# Patient Record
Sex: MALE | Race: OTHER | Employment: FULL TIME | URBAN - METROPOLITAN AREA
[De-identification: names, ages, dates, MRNs, and addresses within clinical notes are randomized per-mention and may not be internally consistent; named-entity substitution may affect disease eponyms.]

---

## 2017-02-06 ENCOUNTER — OFFICE VISIT (OUTPATIENT)
Dept: FAMILY MEDICINE CLINIC | Age: 47
End: 2017-02-06

## 2017-02-06 VITALS
OXYGEN SATURATION: 99 % | BODY MASS INDEX: 27.11 KG/M2 | DIASTOLIC BLOOD PRESSURE: 83 MMHG | RESPIRATION RATE: 20 BRPM | HEART RATE: 68 BPM | SYSTOLIC BLOOD PRESSURE: 129 MMHG | WEIGHT: 183 LBS | HEIGHT: 69 IN | TEMPERATURE: 97.9 F

## 2017-02-06 DIAGNOSIS — G47.00 INSOMNIA, UNSPECIFIED TYPE: ICD-10-CM

## 2017-02-06 DIAGNOSIS — R45.89 DEPRESSED MOOD: ICD-10-CM

## 2017-02-06 DIAGNOSIS — Z00.00 ADULT GENERAL MEDICAL EXAM: Primary | ICD-10-CM

## 2017-02-06 DIAGNOSIS — Z23 ENCOUNTER FOR IMMUNIZATION: ICD-10-CM

## 2017-02-06 DIAGNOSIS — E78.2 MIXED HYPERLIPIDEMIA: ICD-10-CM

## 2017-02-06 DIAGNOSIS — R53.83 FATIGUE, UNSPECIFIED TYPE: ICD-10-CM

## 2017-02-06 DIAGNOSIS — R35.1 NOCTURIA: ICD-10-CM

## 2017-02-06 DIAGNOSIS — R07.9 CHEST PAIN, UNSPECIFIED TYPE: ICD-10-CM

## 2017-02-06 DIAGNOSIS — N40.1 BENIGN NON-NODULAR PROSTATIC HYPERPLASIA WITH LOWER URINARY TRACT SYMPTOMS: ICD-10-CM

## 2017-02-06 LAB
BILIRUB UR QL STRIP: NEGATIVE
GLUCOSE UR-MCNC: NEGATIVE MG/DL
KETONES P FAST UR STRIP-MCNC: NEGATIVE MG/DL
PH UR STRIP: 5.5 [PH] (ref 4.6–8)
PROT UR QL STRIP: NEGATIVE MG/DL
SP GR UR STRIP: 1.02 (ref 1–1.03)
UA UROBILINOGEN AMB POC: NORMAL (ref 0.2–1)
URINALYSIS CLARITY POC: CLEAR
URINALYSIS COLOR POC: YELLOW
URINE BLOOD POC: NEGATIVE
URINE LEUKOCYTES POC: NEGATIVE
URINE NITRITES POC: NEGATIVE

## 2017-02-06 RX ORDER — ATORVASTATIN CALCIUM 80 MG/1
80 TABLET, FILM COATED ORAL DAILY
Qty: 30 TAB | Refills: 3 | Status: SHIPPED | OUTPATIENT
Start: 2017-02-06 | End: 2019-08-29

## 2017-02-06 RX ORDER — TAMSULOSIN HYDROCHLORIDE 0.4 MG/1
0.4 CAPSULE ORAL DAILY
Qty: 30 CAP | Refills: 1 | Status: SHIPPED | OUTPATIENT
Start: 2017-02-06 | End: 2017-02-20 | Stop reason: SDUPTHER

## 2017-02-06 RX ORDER — SERTRALINE HYDROCHLORIDE 25 MG/1
25 TABLET, FILM COATED ORAL DAILY
Qty: 30 TAB | Refills: 1 | Status: SHIPPED | OUTPATIENT
Start: 2017-02-06 | End: 2018-02-13 | Stop reason: SDUPTHER

## 2017-02-06 NOTE — PATIENT INSTRUCTIONS
1. Restart your cholesterol medication, Lipitor    2. A stress test has been ordered for you. Please schedule and have this done. 3. We will start Zoloft for your depression. Follow up in 2 weeks to see how things are going    4. If you are unable to get into a counselor with your current insurance, you can follow up in our [address] 5. We will start a medication called Flomax (information included below) to see if this helps with your urinary issues. 6.  If you feel that your stress is overwhelming and you need to speak with someone immediately, please call the crisis hotline. [address]  [phone]  [address] [address] [phone] [address] [facility]-  [phone]  [facility]-  [phone]         Benign Prostatic Hyperplasia: Care Instructions  Your Care Instructions    Benign prostatic hyperplasia, or BPH, is an enlarged prostate gland. The prostate is a small gland that makes some of the fluid in semen. Prostate enlargement happens to almost all men as they age. It is usually not serious. BPH does not cause prostate cancer. As the prostate gets bigger, it may partly block the flow of urine. You may have a hard time getting a urine stream started or completely stopped. BPH can cause dribbling. You may have a weak urine stream, or you may have to urinate more often than you used to, especially at night. Most men find these problems easy to manage. You do not need treatment unless your symptoms bother you a lot or you have other problems, such as bladder infections or stones. In these cases, medicines may help. Surgery is not needed unless the urine flow is blocked or the symptoms do not get better with medicine. Follow-up care is a key part of your treatment and safety. Be sure to make and go to all appointments, and call your doctor if you are having problems.  It's also a good idea to know your test results and keep a list of the medicines you take. How can you care for yourself at home? · Take plenty of time to urinate. Try to relax. · Try \"double voiding. \" Urinate as much you can, relax for a few moments, and then try to urinate again. · Sit on the toilet to urinate. · Read or think of other things while you are waiting. · Turn on a faucet, or try to picture running water. Some men find that this helps get their urine flowing. · If dribbling is a problem, wash your penis daily to avoid skin irritation and infection. · Avoid caffeine and alcohol. These drinks will increase how often you need to urinate. Spread your fluid intake throughout the day. If the urge to urinate often wakes you at night, limit your fluid intake in the evening. Urinate right before you go to bed. · Many over-the-counter cold and allergy medicines can make the symptoms of BPH worse. Avoid antihistamines, decongestants, and allergy pills, if you can. Read the warnings on the package. · If you take any prescription medicines, especially tranquilizers or antidepressants, ask your doctor or pharmacist whether they can cause urination problems. There may be other medicines you can use that do not cause urinary problems. · Be safe with medicines. Take your medicines exactly as prescribed. Call your doctor if you think you are having a problem with your medicine. When should you call for help? Call your doctor now or seek immediate medical care if:  · You cannot urinate at all. · You have symptoms of a urinary infection. For example:  ¨ You have blood or pus in your urine. ¨ You have pain in your back just below your rib cage. This is called flank pain. ¨ You have a fever, chills, or body aches. ¨ It hurts to urinate. ¨ You have groin or belly pain. Watch closely for changes in your health, and be sure to contact your doctor if:  · It hurts when you ejaculate.   · Your urinary problems get a lot worse or bother you a lot. Where can you learn more? Go to http://mary-clara.info/. Enter O641 in the search box to learn more about \"Benign Prostatic Hyperplasia: Care Instructions. \"  Current as of: May 24, 2016  Content Version: 11.1  © 9641-8354 [employer]. Care instructions adapted under license by [employer] (which disclaims liability or warranty for this information). If you have questions about a medical condition or this instruction, always ask your healthcare professional. [address] any warranty or liability for your use of this information. Tamsulosin (By mouth)   Tamsulosin Hydrochloride (sbj-WEO-jut-sin brandy-droe-KLOR-toya)  Treats benign prostatic hyperplasia (enlarged prostate). Brand Name(s):Flomax   There may be other brand names for this medicine. When This Medicine Should Not Be Used: This medicine is not right for everyone. Do not use it if you had an allergic reaction to tamsulosin. How to Use This Medicine:   Capsule  · Take your medicine as directed. Your dose may need to be changed several times to find what works best for you. · Take this medicine 30 minutes after the same meal each day. Swallow the capsule whole. Do not crush, chew, or open it. · Read and follow the patient instructions that come with this medicine. Talk to your doctor or pharmacist if you have any questions. · Missed dose: Take a dose as soon as you remember. If it is almost time for your next dose, wait until then and take a regular dose. Do not take extra medicine to make up for a missed dose. If you forget to take this medicine for several days in a row, talk to your doctor before you start taking it again. · Store the medicine in a closed container at room temperature, away from heat, moisture, and direct light.   Drugs and Foods to Avoid:   Ask your doctor or pharmacist before using any other medicine, including over-the-counter medicines, vitamins, and herbal products. · Some medicines can affect how tamsulosin works. Tell your doctor if you are using another alpha blocker medicine, cimetidine, erythromycin, ketoconazole, paroxetine, terbinafine, warfarin, or medicine for erectile dysfunction. Warnings While Using This Medicine:   · Tell your doctor if you have kidney disease, liver disease, low blood pressure, prostate cancer, or an allergy to sulfa drugs. · Tell your doctor if you plan to have cataract or glaucoma surgery. This medicine may cause eye problems during surgery. · This medicine may make you dizzy or lightheaded. Do not drive or do anything else that could be dangerous until you know how this medicine affects you. Stand or sit up slowly if you feel dizzy. · Your doctor will check your progress and the effects of this medicine at regular visits. Keep all appointments. · Keep all medicine out of the reach of children. Never share your medicine with anyone. Possible Side Effects While Using This Medicine:   Call your doctor right away if you notice any of these side effects:  · Allergic reaction: Itching or hives, swelling in your face or hands, swelling or tingling in your mouth or throat, chest tightness, trouble breathing  · Blistering, peeling, red skin rash  · Lightheadedness, dizziness, fainting  · Painful, prolonged erection of your penis  If you notice these less serious side effects, talk with your doctor:   · Headache  · Problems with ejaculation  · Runny or stuffy nose  If you notice other side effects that you think are caused by this medicine, tell your doctor. Call your doctor for medical advice about side effects. You may report side effects to FDA at 8-178-FDA-7215  © 2016 [address] is for End User's use only and may not be sold, redistributed or otherwise used for commercial purposes. The above information is an  only.  It is not intended as medical advice for individual conditions or treatments. Talk to your doctor, nurse or pharmacist before following any medical regimen to see if it is safe and effective for you.

## 2017-02-06 NOTE — MR AVS SNAPSHOT
Visit Information Date & Time Provider Department Dept. Phone Encounter #  
 2/6/2017  8:00 AM Arsen Duggan [facility] [phone] [id number] Follow-up Instructions Return in about 2 weeks (around 2/20/2017) for Depression. Upcoming Health Maintenance Date Due DTaP/Tdap/Td series (1 - Tdap) 8/20/1991 INFLUENZA AGE 9 TO ADULT 8/1/2016 Allergies as of 2/6/2017  Review Complete On: 2/6/2017 By: Chase Negro LPN No Known Allergies Current Immunizations  Reviewed on 11/2/2015 Name Date Influenza Nasal Vaccine 10/27/2015 Not reviewed this visit You Were Diagnosed With   
  
 Codes Comments Adult general medical exam    -  Primary ICD-10-CM: Z00.00 ICD-9-CM: V70.9 Mixed hyperlipidemia     ICD-10-CM: E78.2 ICD-9-CM: 272.2 Depressed mood     ICD-10-CM: F32.9 ICD-9-CM: 013 Insomnia, unspecified type     ICD-10-CM: G47.00 ICD-9-CM: 780.52 Fatigue, unspecified type     ICD-10-CM: R53.83 ICD-9-CM: 780.79 Nocturia     ICD-10-CM: R35.1 ICD-9-CM: 788.43 Chest pain, unspecified type     ICD-10-CM: R07.9 ICD-9-CM: 786.50 Vitals BP Pulse Temp Resp Height(growth percentile) Weight(growth percentile) 145/84 (BP 1 Location: Left arm, BP Patient Position: Sitting) 66 97.9 °F (36.6 °C) (Oral) 20 5' 9\" (1.753 m) 183 lb (83 kg) SpO2 BMI Smoking Status 99% 27.02 kg/m2 Former Smoker Vitals History BMI and BSA Data Body Mass Index Body Surface Area  
 27.02 kg/m 2 2.01 m 2 Preferred Pharmacy Pharmacy Name Phone [facility] [address] [place] [facility] [phone] Your Updated Medication List  
  
   
This list is accurate as of: 2/6/17  8:40 AM.  Always use your most recent med list.  
  
  
  
  
 atorvastatin 80 mg tablet Commonly known as:  LIPITOR Take 1 Tab by mouth daily. Indications: mixed hyperlipidemia  
  
 naproxen 500 mg tablet Commonly known as:  NAPROSYN Take 1 Tab by mouth two (2) times daily (with meals). omeprazole 20 mg capsule Commonly known as:  PRILOSEC Take 1 Cap by mouth daily. Indications: GASTROESOPHAGEAL REFLUX  
  
 sertraline 25 mg tablet Commonly known as:  ZOLOFT Take 1 Tab by mouth daily. tamsulosin 0.4 mg capsule Commonly known as:  FLOMAX Take 1 Cap by mouth daily. Prescriptions Sent to Pharmacy Refills  
 atorvastatin (LIPITOR) 80 mg tablet 3 Sig: Take 1 Tab by mouth daily. Indications: mixed hyperlipidemia Class: Normal  
 Pharmacy: [address] Ph #: [phone] Route: Oral  
 sertraline (ZOLOFT) 25 mg tablet 1 Sig: Take 1 Tab by mouth daily. Class: Normal  
 Pharmacy: [address] Ph #: [phone] Route: Oral  
 tamsulosin (FLOMAX) 0.4 mg capsule 1 Sig: Take 1 Cap by mouth daily. Class: Normal  
 Pharmacy: [address] Ph #: [phone] Route: Oral  
  
We Performed the Following AMB POC EKG ROUTINE W/ 12 LEADS, INTER & REP [48774 CPT(R)] AMB POC URINALYSIS DIP STICK AUTO W/O MICRO [52232 CPT(R)] CBC W/O DIFF [27401 CPT(R)] LIPID PANEL [88075 CPT(R)] METABOLIC PANEL, COMPREHENSIVE [06353 CPT(R)] TSH 3RD GENERATION [25205 CPT(R)] Follow-up Instructions Return in about 2 weeks (around 2/20/2017) for Depression. To-Do List   
 02/06/2017 ECG:  STRESS TEST CARDIAC Patient Instructions 1. Restart your cholesterol medication, Lipitor 2. A stress test has been ordered for you. Please schedule and have this done. 3. We will start Zoloft for your depression. Follow up in 2 weeks to see how things are going 4. If you are unable to get into a counselor with your current insurance, you can follow up in our [address] 5. We will start a medication called Flomax (information included below) to see if this helps with your urinary issues. 6.  If you feel that your stress is overwhelming and you need to speak with someone immediately, please call the crisis hotline. [employer]  [phone] [facility] [address] [phone] [place] [address]  [phone] [employer]- [phone] [employer]-  [phone] 7 [facility]  [phone] [address] [facility]  [phone] Benign Prostatic Hyperplasia: Care Instructions Your Care Instructions Benign prostatic hyperplasia, or BPH, is an enlarged prostate gland. The prostate is a small gland that makes some of the fluid in semen. Prostate enlargement happens to almost all men as they age. It is usually not serious. BPH does not cause prostate cancer. As the prostate gets bigger, it may partly block the flow of urine. You may have a hard time getting a urine stream started or completely stopped. BPH can cause dribbling. You may have a weak urine stream, or you may have to urinate more often than you used to, especially at night. Most men find these problems easy to manage. You do not need treatment unless your symptoms bother you a lot or you have other problems, such as bladder infections or stones. In these cases, medicines may help. Surgery is not needed unless the urine flow is blocked or the symptoms do not get better with medicine. Follow-up care is a key part of your treatment and safety. Be sure to make and go to all appointments, and call your doctor if you are having problems. It's also a good idea to know your test results and keep a list of the medicines you take. How can you care for yourself at home? · Take plenty of time to urinate. Try to relax. · Try \"double voiding. \" Urinate as much you can, relax for a few moments, and then try to urinate again. · Sit on the toilet to urinate. · Read or think of other things while you are waiting. · Turn on a faucet, or try to picture running water. Some men find that this helps get their urine flowing. · If dribbling is a problem, wash your penis daily to avoid skin irritation and infection. · Avoid caffeine and alcohol. These drinks will increase how often you need to urinate. Spread your fluid intake throughout the day. If the urge to urinate often wakes you at night, limit your fluid intake in the evening. Urinate right before you go to bed. · Many over-the-counter cold and allergy medicines can make the symptoms of BPH worse. Avoid antihistamines, decongestants, and allergy pills, if you can. Read the warnings on the package. · If you take any prescription medicines, especially tranquilizers or antidepressants, ask your doctor or pharmacist whether they can cause urination problems. There may be other medicines you can use that do not cause urinary problems. · Be safe with medicines. Take your medicines exactly as prescribed. Call your doctor if you think you are having a problem with your medicine. When should you call for help? Call your doctor now or seek immediate medical care if: 
· You cannot urinate at all. · You have symptoms of a urinary infection. For example: ¨ You have blood or pus in your urine. ¨ You have pain in your back just below your rib cage. This is called flank pain. ¨ You have a fever, chills, or body aches. ¨ It hurts to urinate. ¨ You have groin or belly pain. Watch closely for changes in your health, and be sure to contact your doctor if: 
· It hurts when you ejaculate. · Your urinary problems get a lot worse or bother you a lot. Where can you learn more? Go to http://delio.info/. Enter W174 in the search box to learn more about \"Benign Prostatic Hyperplasia: Care Instructions. \" Current as of: May 24, 2016 Content Version: 11.1 © 7099-7062 [employer]. Care instructions adapted under license by [employer] (which disclaims liability or warranty for this information). If you have questions about a medical condition or this instruction, always ask your healthcare professional. [address] any warranty or liability for your use of this information. Tamsulosin (By mouth) Tamsulosin Hydrochloride (gtt-YZS-mpl-sin brandy-droe-KLOR-toya) Treats benign prostatic hyperplasia (enlarged prostate). Brand Name(s):Flomax There may be other brand names for this medicine. When This Medicine Should Not Be Used: This medicine is not right for everyone. Do not use it if you had an allergic reaction to tamsulosin. How to Use This Medicine:  
Capsule · Take your medicine as directed. Your dose may need to be changed several times to find what works best for you. · Take this medicine 30 minutes after the same meal each day. Swallow the capsule whole. Do not crush, chew, or open it. · Read and follow the patient instructions that come with this medicine. Talk to your doctor or pharmacist if you have any questions. · Missed dose: Take a dose as soon as you remember. If it is almost time for your next dose, wait until then and take a regular dose. Do not take extra medicine to make up for a missed dose. If you forget to take this medicine for several days in a row, talk to your doctor before you start taking it again. · Store the medicine in a closed container at room temperature, away from heat, moisture, and direct light. Drugs and Foods to Avoid: Ask your doctor or pharmacist before using any other medicine, including over-the-counter medicines, vitamins, and herbal products. · Some medicines can affect how tamsulosin works.  Tell your doctor if you are using another alpha blocker medicine, cimetidine, erythromycin, ketoconazole, paroxetine, terbinafine, warfarin, or medicine for erectile dysfunction. Warnings While Using This Medicine: · Tell your doctor if you have kidney disease, liver disease, low blood pressure, prostate cancer, or an allergy to sulfa drugs. · Tell your doctor if you plan to have cataract or glaucoma surgery. This medicine may cause eye problems during surgery. · This medicine may make you dizzy or lightheaded. Do not drive or do anything else that could be dangerous until you know how this medicine affects you. Stand or sit up slowly if you feel dizzy. · Your doctor will check your progress and the effects of this medicine at regular visits. Keep all appointments. · Keep all medicine out of the reach of children. Never share your medicine with anyone. Possible Side Effects While Using This Medicine:  
Call your doctor right away if you notice any of these side effects: · Allergic reaction: Itching or hives, swelling in your face or hands, swelling or tingling in your mouth or throat, chest tightness, trouble breathing · Blistering, peeling, red skin rash · Lightheadedness, dizziness, fainting · Painful, prolonged erection of your penis If you notice these less serious side effects, talk with your doctor:  
· Headache · Problems with ejaculation · Runny or stuffy nose If you notice other side effects that you think are caused by this medicine, tell your doctor. Call your doctor for medical advice about side effects. You may report side effects to FDA at 3-760-FDA-2174 © 2016 [address] is for End User's use only and may not be sold, redistributed or otherwise used for commercial purposes. The above information is an  only. It is not intended as medical advice for individual conditions or treatments.  Talk to your doctor, nurse or pharmacist before following any medical regimen to see if it is safe and effective for you. Introducing [facility]! Collette Mansfield introduces [employer] patient portal. Now you can access parts of your medical record, email your doctor's office, and request medication refills online. 1. In your internet browser, go to [email or web address] 2. Click on the First Time User? Click Here link in the Sign In box. You will see the New Member Sign Up page. 3. Enter your [employer] Access Code exactly as it appears below. You will not need to use this code after youve completed the sign-up process. If you do not sign up before the expiration date, you must request a new code. · [employer] Access Code: [email or web address] Expires: 5/7/2017  8:40 AM 
 
4. Enter the last four digits of your Social Security Number (xxxx) and Date of Birth (mm/dd/yyyy) as indicated and click Submit. You will be taken to the next sign-up page. 5. Create a [employer] ID. This will be your [employer] login ID and cannot be changed, so think of one that is secure and easy to remember. 6. Create a [employer] password. You can change your password at any time. 7. Enter your Password Reset Question and Answer. This can be used at a later time if you forget your password. 8. Enter your e-mail address. You will receive e-mail notification when new information is available in [address]. 9. Click Sign Up. You can now view and download portions of your medical record. 10. Click the Download Summary menu link to download a portable copy of your medical information. If you have questions, please visit the Frequently Asked Questions section of the [employer] website. Remember, [employer] is NOT to be used for urgent needs. For medical emergencies, dial 911. Now available from your iPhone and Android! Please provide this summary of care documentation to your next provider. Your primary care clinician is listed as Isha Ellison. If you have any questions after today's visit, please call [phone].

## 2017-02-06 NOTE — PROGRESS NOTES
Chief Complaint   Patient presents with    Well Male     patient fasting    Influenza Vaccine per order from MD  1. Have you been to the ER, urgent care clinic since your last visit? Hospitalized since your last visit? No    2. Have you seen or consulted any other health care providers outside of the [address] [place] [address] since your last visit? Include any pap smears or colon screening. No     Reviewed record in preparation for visit and have obtained necessary documentation.

## 2017-02-06 NOTE — PROGRESS NOTES
Subjective:   Ramirez Zhao is an 55 y.o. male who presents for complete physical exam.    Doing well. Not taking any medications listed. Complains of left sided back and side pain. The pain radiates down into the groin. The pain is worse when lying on that side. Nocturia 4-6 times per night. No blood in urine. No hx of kidney stones. Also endorses dry mouth and needing to drink a lot of water. Reports that at times he has difficulty starting his stream and feels that he is unable to empty his bladder completely. Feels that he is stressed. Waking frequently at night and feeling tired throughout the night. Has REJI but not using CPAP. Continues to have left sided chest pain. Not associated with activity. He notes associated nausea and reflux. Has tried Prilosec which helps with the reflex, but no change in the chest pain. Diet: A lot of fast foods. Drinks mostly soda. Exercise: No formal exercise regimen    Stress/ psych:  Under a lot of stress. AUA Symptom Score 2/6/2017   Over the past month how often have you had the sensation that your bladder was not completely empty after you finished urinating? 3   Over the past month, how often have had to urinate again less than 2 hours after you last finished urinating? 3   Over the past month, how often have you found you stopped and started again several times when you urinated? 3   Over the past month, how often have you found it difficult to postpone urination? 3   Over the past month, how often have you had a weak urinary stream? 3   Over the past month, how often have you had to push or strain to begin urinating? 3   Over the past month, how many times did you most typically get up to urinate from the time you went to bed at night until the time you got up in the morning?  3   AUA Score 21         Allergies - reviewed:   No Known Allergies      Medications - reviewed:  Current Outpatient Prescriptions   Medication Sig    atorvastatin (LIPITOR) 80 mg tablet Take 1 Tab by mouth daily. Indications: mixed hyperlipidemia    sertraline (ZOLOFT) 25 mg tablet Take 1 Tab by mouth daily.  tamsulosin (FLOMAX) 0.4 mg capsule Take 1 Cap by mouth daily.  naproxen (NAPROSYN) 500 mg tablet Take 1 Tab by mouth two (2) times daily (with meals).  omeprazole (PRILOSEC) 20 mg capsule Take 1 Cap by mouth daily. Indications: GASTROESOPHAGEAL REFLUX     No current facility-administered medications for this visit. Past Medical History - reviewed:  Past Medical History   Diagnosis Date    DJD (degenerative joint disease) of cervical spine          Past Surgical History - reviewed:  Past Surgical History   Procedure Laterality Date    Hx orthopaedic  1994     ankle surgery         Family History - reviewed:  Family History   Problem Relation Age of Onset    Diabetes Mother     Hypertension Mother     Diabetes Father     Hypertension Father          Social History - reviewed:  Social History     Social History    Marital status:      Spouse name: N/A    Number of children: N/A    Years of education: N/A     Occupational History    Not on file. Social History Main Topics    Smoking status: Former Smoker     Quit date: 11/2/2000    Smokeless tobacco: Never Used    Alcohol use Yes      Comment: socially    Drug use: No    Sexual activity: Yes     Other Topics Concern    Not on file     Social History Narrative         Immunizations - reviewed:   Immunization History   Administered Date(s) Administered    Influenza Nasal Vaccine 10/27/2015    Influenza Vaccine (Quad) PF 02/06/2017     Flu: Due  Tdap: Up to date    Health Maintenance reviewed -  Colonoscopy : Not indicated     Review of systems:  Items bolded if positive.   Constitutional: Fever, chills, night sweats, weight loss, lymphadenopathy, fatigue  HEENT: Vision change, eye pain, rhinorrhea, sinus pain, epistaxis, dysphagia, change in hearing, tinnitus, vertigo.    Endocrine: Weight change, heat/ cold intolerance, tremor, insomnia, polyuria, polydipsia, polyphagia, abnl hair growth, nail changes  Cardiovascular: Chest pain, palpitations, syncope, lower extremity edema, orthopnea, paroxysmal nocturnal dyspnea  Pulmonary: Shortness of breath, dyspnea on exertion, cough, hemoptysis, wheezing  GI: Nausea, vomiting, diarrhea, melena, hematochezia, change in appetite, abdominal pain, change in bowel habits or stools  : Dysuria, frequency, urgency, incontinence, hematuria, nocturia  Musculoskeletal: joint swelling or pain, muscle pain, back pain  Skin:  Rash, New/growing/changing skin lesions  Neurologic: Headache, muscle weakness, paresthesias, anesthesia, ataxia, change in speech, change in gait   Psychiatric: depression, anxiety, hallucinations, trevon, SI/HI        Objective:     Visit Vitals    /83 (BP 1 Location: Left arm, BP Patient Position: Sitting)    Pulse 68    Temp 97.9 °F (36.6 °C) (Oral)    Resp 20    Ht 5' 9\" (1.753 m)    Wt 183 lb (83 kg)    SpO2 99%    BMI 27.02 kg/m2       General appearance - alert, well appearing, and in no distress and anxious  Eyes - pupils equal and reactive, extraocular eye movements intact  Ears - bilateral TM's and external ear canals normal  Nose - normal and patent, no erythema, discharge or polyps  Mouth - mucous membranes moist, pharynx normal without lesions  Neck - supple, no significant adenopathy  Chest - clear to auscultation, no wheezes, rales or rhonchi, symmetric air entry  Heart - normal rate, regular rhythm, normal S1, S2, no murmurs, rubs, clicks or gallops  Abdomen - soft, nontender, nondistended, no masses or organomegaly  Neurological - alert, oriented, normal speech, no focal findings or movement disorder noted, motor and sensory grossly normal bilaterally  Musculoskeletal - no joint tenderness, deformity or swelling  Extremities - peripheral pulses normal, no pedal edema, no clubbing or cyanosis  Skin - normal coloration and turgor, no rashes, no suspicious skin lesions noted  Rectal - Normal rectal exam without gross blood or masses. Prostate enlarged 2+, smooth, slightly tender. Recent Results (from the past 8 hour(s))   AMB POC URINALYSIS DIP STICK AUTO W/O MICRO    Collection Time: 02/06/17  9:11 AM   Result Value Ref Range    Color (UA POC) Yellow     Clarity (UA POC) Clear     Glucose (UA POC) Negative Negative    Bilirubin (UA POC) Negative Negative    Ketones (UA POC) Negative Negative    Specific gravity (UA POC) 1.025 1.001 - 1.035    Blood (UA POC) Negative Negative    pH (UA POC) 5.5 4.6 - 8.0    Protein (UA POC) Negative Negative mg/dL    Urobilinogen (UA POC) 0.2 mg/dL 0.2 - 1    Nitrites (UA POC) Negative Negative    Leukocyte esterase (UA POC) Negative Negative         Assessment:       ICD-10-CM ICD-9-CM    1. Adult general medical exam Z00.00 V70.9    2. Mixed hyperlipidemia E78.2 272.2 atorvastatin (LIPITOR) 80 mg tablet      METABOLIC PANEL, COMPREHENSIVE      LIPID PANEL      CBC W/O DIFF   3. Depressed mood F32.9 311 TSH 3RD GENERATION      sertraline (ZOLOFT) 25 mg tablet   4. Insomnia, unspecified type G47.00 780.52 TSH 3RD GENERATION   5. Fatigue, unspecified type R53.83 780.79 TSH 3RD GENERATION      CBC W/O DIFF   6. Nocturia R35.1 788.43 AMB POC URINALYSIS DIP STICK AUTO W/O MICRO      tamsulosin (FLOMAX) 0.4 mg capsule   7. Chest pain, unspecified type R07.9 786.50 STRESS TEST CARDIAC      AMB POC EKG ROUTINE W/ 12 LEADS, INTER & REP   8. Benign non-nodular prostatic hyperplasia with lower urinary tract symptoms N40.1 600.91    9.  Encounter for immunization Z23 V03.89 INFLUENZA VIRUS VAC QUAD,SPLIT,PRESV FREE SYRINGE 3/> YRS IM      MI IMMUNIZ ADMIN,1 SINGLE/COMB VAC/TOXOID           Plan:   · Counseled re: diet, exercise, healthy lifestyle    · Appropriate labs, vaccines, imaging studies, and referrals ordered as listed above    · Fasting for labs today    · Depressed and anxious. Not compliant with prior therapy. Will trial Zoloft. Recommended counseling and referred to our [facility]. · Chronic chest pain. Unchanged EKG in office today. Treadmill stress test ordered again today. · Trial Flomax for BPH. UA normal.      Follow-up Disposition:  Return in about 2 weeks (around 2/20/2017) for Depression. Will need PHQ9 and assess need for Pneumovax. I have discussed the diagnosis with the patient and the intended plan as seen in the above orders. The patient has received an after-visit summary and questions were answered concerning future plans. I have discussed medication side effects and warnings with the patient as well. Informed pt to return to the office if new symptoms arise.       Shelbi Addison MD  Family Medicine Resident

## 2017-02-07 LAB
ALBUMIN SERPL-MCNC: 4.5 G/DL (ref 3.5–5.5)
ALBUMIN/GLOB SERPL: 1.9 {RATIO} (ref 1.1–2.5)
ALP SERPL-CCNC: 47 IU/L (ref 39–117)
ALT SERPL-CCNC: 28 IU/L (ref 0–44)
AST SERPL-CCNC: 21 IU/L (ref 0–40)
BILIRUB SERPL-MCNC: 0.3 MG/DL (ref 0–1.2)
BUN SERPL-MCNC: 14 MG/DL (ref 6–24)
BUN/CREAT SERPL: 12 (ref 9–20)
CALCIUM SERPL-MCNC: 9.3 MG/DL (ref 8.7–10.2)
CHLORIDE SERPL-SCNC: 101 MMOL/L (ref 96–106)
CHOLEST SERPL-MCNC: 274 MG/DL (ref 100–199)
CO2 SERPL-SCNC: 22 MMOL/L (ref 18–29)
CREAT SERPL-MCNC: 1.16 MG/DL (ref 0.76–1.27)
ERYTHROCYTE [DISTWIDTH] IN BLOOD BY AUTOMATED COUNT: 13.9 % (ref 12.3–15.4)
GLOBULIN SER CALC-MCNC: 2.4 G/DL (ref 1.5–4.5)
GLUCOSE SERPL-MCNC: 98 MG/DL (ref 65–99)
HCT VFR BLD AUTO: 43.9 % (ref 37.5–51)
HDLC SERPL-MCNC: 42 MG/DL
HGB BLD-MCNC: 15 G/DL (ref 12.6–17.7)
INTERPRETATION, 910389: NORMAL
LDLC SERPL CALC-MCNC: 167 MG/DL (ref 0–99)
MCH RBC QN AUTO: 31 PG (ref 26.6–33)
MCHC RBC AUTO-ENTMCNC: 34.2 G/DL (ref 31.5–35.7)
MCV RBC AUTO: 91 FL (ref 79–97)
PLATELET # BLD AUTO: 246 X10E3/UL (ref 150–379)
POTASSIUM SERPL-SCNC: 4.4 MMOL/L (ref 3.5–5.2)
PROT SERPL-MCNC: 6.9 G/DL (ref 6–8.5)
RBC # BLD AUTO: 4.84 X10E6/UL (ref 4.14–5.8)
SODIUM SERPL-SCNC: 141 MMOL/L (ref 134–144)
TRIGL SERPL-MCNC: 324 MG/DL (ref 0–149)
TSH SERPL DL<=0.005 MIU/L-ACNC: 1.49 UIU/ML (ref 0.45–4.5)
VLDLC SERPL CALC-MCNC: 65 MG/DL (ref 5–40)
WBC # BLD AUTO: 6.8 X10E3/UL (ref 3.4–10.8)

## 2017-02-08 NOTE — PROGRESS NOTES
ASCVD risk is 11.9% over 10 years with estimated reduction to 1.9% with compliance with statin  Otherwise, labs look good    Will send letter  Statin already refilled to pharmacy  Review results at next visit

## 2017-02-20 ENCOUNTER — TELEPHONE (OUTPATIENT)
Dept: FAMILY MEDICINE CLINIC | Age: 47
End: 2017-02-20

## 2017-02-20 ENCOUNTER — OFFICE VISIT (OUTPATIENT)
Dept: FAMILY MEDICINE CLINIC | Age: 47
End: 2017-02-20

## 2017-02-20 VITALS
WEIGHT: 190 LBS | OXYGEN SATURATION: 98 % | DIASTOLIC BLOOD PRESSURE: 81 MMHG | BODY MASS INDEX: 28.14 KG/M2 | HEIGHT: 69 IN | HEART RATE: 63 BPM | SYSTOLIC BLOOD PRESSURE: 135 MMHG | TEMPERATURE: 97.8 F

## 2017-02-20 DIAGNOSIS — Z13.31 SCREENING FOR DEPRESSION: ICD-10-CM

## 2017-02-20 DIAGNOSIS — F41.9 ANXIETY: ICD-10-CM

## 2017-02-20 DIAGNOSIS — N52.9 ERECTILE DYSFUNCTION, UNSPECIFIED ERECTILE DYSFUNCTION TYPE: ICD-10-CM

## 2017-02-20 DIAGNOSIS — Z23 ENCOUNTER FOR IMMUNIZATION: ICD-10-CM

## 2017-02-20 DIAGNOSIS — Z72.0 TOBACCO ABUSE: ICD-10-CM

## 2017-02-20 DIAGNOSIS — R35.1 NOCTURIA: ICD-10-CM

## 2017-02-20 DIAGNOSIS — F32.1 MAJOR DEPRESSIVE DISORDER, SINGLE EPISODE, MODERATE (HCC): Primary | ICD-10-CM

## 2017-02-20 RX ORDER — TAMSULOSIN HYDROCHLORIDE 0.4 MG/1
0.8 CAPSULE ORAL DAILY
Qty: 60 CAP | Refills: 1 | Status: SHIPPED | OUTPATIENT
Start: 2017-02-20 | End: 2017-09-18 | Stop reason: SDUPTHER

## 2017-02-20 RX ORDER — SILDENAFIL 25 MG/1
25 TABLET, FILM COATED ORAL AS NEEDED
Qty: 20 TAB | Refills: 0 | Status: SHIPPED | OUTPATIENT
Start: 2017-02-20 | End: 2017-02-20

## 2017-02-20 RX ORDER — HYDROXYZINE PAMOATE 25 MG/1
25 CAPSULE ORAL
Qty: 30 CAP | Refills: 0 | Status: SHIPPED | OUTPATIENT
Start: 2017-02-20 | End: 2017-03-19 | Stop reason: SDUPTHER

## 2017-02-20 RX ORDER — TADALAFIL 10 MG/1
10 TABLET ORAL
Qty: 20 TAB | Refills: 0 | Status: SHIPPED | OUTPATIENT
Start: 2017-02-20 | End: 2017-11-21 | Stop reason: SDUPTHER

## 2017-02-20 NOTE — PROGRESS NOTES
History of Present Illness:     Chief Complaint   Patient presents with    Medication Evaluation     lipitor/flomax/Zoloft    Depression    Cholesterol Problem    Nocturia     Pt is a 55y.o. year old male    Depression: Tolerating Zoloft without any issues. No notable improvement in symptoms at this point. He is still having difficulty sleeping, depressed mood and anhedonia. He denies SI or self destructive behavior. He was able to spend time with his son this weekend, which he enjoys. BPH:  Current dose of Flomax 0.4mg not effective. He is still waking 3-4 times at night to urinate. Tolerating the Flomax. HLD:  Taking Lipitor for Cholesterol. Tolerating without any difficulty. Smoking:  Currently smoking throughout the week. He wants to quit but having a difficult time doing so with his stressors. Knows it is not good and wants to quit for his son. Past Medical History   Diagnosis Date    DJD (degenerative joint disease) of cervical spine          Current Outpatient Prescriptions on File Prior to Visit   Medication Sig Dispense Refill    atorvastatin (LIPITOR) 80 mg tablet Take 1 Tab by mouth daily. Indications: mixed hyperlipidemia 30 Tab 3    sertraline (ZOLOFT) 25 mg tablet Take 1 Tab by mouth daily. 30 Tab 1    naproxen (NAPROSYN) 500 mg tablet Take 1 Tab by mouth two (2) times daily (with meals). 10 Tab 0    omeprazole (PRILOSEC) 20 mg capsule Take 1 Cap by mouth daily. Indications: GASTROESOPHAGEAL REFLUX 30 Cap 0     No current facility-administered medications on file prior to visit.           Allergies:  No Known Allergies      No fever, chills, sweats  No chest pain, MASON, palpitations, LE edema  No cough, sputum production, SOB, pleuritic chest pain, wheezing  No n/v/d  No numbness/ tingling/ weakness in extremities      Objective:     Vitals:    02/20/17 0812   BP: 135/81   Pulse: 63   Temp: 97.8 °F (36.6 °C)   TempSrc: Oral   SpO2: 98%   Weight: 190 lb (86.2 kg) Height: 5' 9\" (1.753 m)       Physical Exam:  General appearance - alert, well appearing, and in no distress and normal appearing weight  Mental status - alert, oriented to person, place, and time, affect appropriate to mood, anxious, fidgeting. Normal thought process and appropriate response to questions. Chest - clear to auscultation, no wheezes, rales or rhonchi, symmetric air entry  Heart - normal rate, regular rhythm, normal S1, S2, no murmurs, rubs, clicks or gallops  Neurological - alert, oriented, normal speech, no focal findings or movement disorder noted    PHQ 2 / 9, over the last two weeks 2/20/2017   Little interest or pleasure in doing things More than half the days   Feeling down, depressed or hopeless More than half the days   Total Score PHQ 2 4   Trouble falling or staying asleep, or sleeping too much Nearly every day   Feeling tired or having little energy Several days   Poor appetite or overeating Several days   Feeling bad about yourself - or that you are a failure or have let yourself or your family down More than half the days   Trouble concentrating on things such as school, work, reading or watching TV Not at all   Moving or speaking so slowly that other people could have noticed; or the opposite being so fidgety that others notice Nearly every day   Thoughts of being better off dead, or hurting yourself in some way Not at all   PHQ 9 Score 14   How difficult have these problems made it for you to do your work, take care of your home and get along with others Somewhat difficult       Recent Labs:  No results found for this or any previous visit (from the past 12 hour(s)). Assessment and Plan:   Pt is a 55y.o. year old male,      ICD-10-CM ICD-9-CM    1. Major depressive disorder, single episode, moderate (HCC) F32.1 296.22 REFERRAL TO NEUROPSYCHOLOGY      BEHAV ASSMT W/SCORE & DOCD/STAND INSTRUMENT   2. Nocturia R35.1 788.43 tamsulosin (FLOMAX) 0.4 mg capsule   3.  Anxiety F41.9 300.00 hydrOXYzine pamoate (VISTARIL) 25 mg capsule      REFERRAL TO NEUROPSYCHOLOGY   4. Erectile dysfunction, unspecified erectile dysfunction type N52.9 607.84 sildenafil citrate (VIAGRA) 25 mg tablet   5. Encounter for immunization Z23 V03.89 PNEUMOCOCCAL POLYSACCHARIDE VACCINE, 23-VALENT, ADULT OR IMMUNOSUPPRESSED PT DOSE,   6. Screening for depression Z13.89 V79.0 BEHAV ASSMT W/SCORE & DOCD/STAND INSTRUMENT   7. Tobacco abuse Z72.0 305.1 PA SMOKING AND TOBACCO USE CESSATION 3 - 10 MINUTES     MDD  - Moderate depression based on PHQ-9  - Continue Zoloft at current dose  - Plan to increase Zoloft to 50mg if no notable improvement at next visit  - Referred to [facility]    Nocturia/ BPH  - Increase Flomax to 0.8mg daily    Anxiety  - Trial Atarax PRN  - Referred to neuropsych for additional testing. I suspect there may be underlying component of ADD given his inability to concentrate, racing thoughts, etc.     Erectile dysfunction  - Canceled Viagra script and sent in Cialis as it is cheaper   - Sent script to pharmacy. Instructed not to start until AFTER stress test done tomorrow and if it is normal.     Smoking cessation counseling done  PPSV 23 administered today    Follow up in 4 weeks for depression. Romi Bianchi MD  Family Medicine Resident    I have discussed the diagnosis with the patient and the intended plan as seen in the above orders. The patient has received an after-visit summary and questions were answered concerning future plans.

## 2017-02-20 NOTE — MR AVS SNAPSHOT
Visit Information Date & Time Provider Department Dept. Phone Encounter #  
 2/20/2017  8:00 AM Selvin Tang, Estephanie5 [facility] [phone] [id number] Follow-up Instructions Return in about 4 weeks (around 3/20/2017) for Depression. Upcoming Health Maintenance Date Due DTaP/Tdap/Td series (2 - Td) 4/4/2026 Allergies as of 2/20/2017  Review Complete On: 2/20/2017 By: Jose Magana LPN No Known Allergies Current Immunizations  Reviewed on 2/6/2017 Name Date Influenza Nasal Vaccine 10/27/2015 Influenza Vaccine (Quad) PF 2/6/2017 Not reviewed this visit You Were Diagnosed With   
  
 Codes Comments Major depressive disorder, single episode, moderate (HCC)    -  Primary ICD-10-CM: F32.1 ICD-9-CM: 296.22 Nocturia     ICD-10-CM: R35.1 ICD-9-CM: 788.43 Anxiety     ICD-10-CM: F41.9 ICD-9-CM: 300.00 Erectile dysfunction, unspecified erectile dysfunction type     ICD-10-CM: N52.9 ICD-9-CM: 607.84 Vitals BP Pulse Temp Height(growth percentile) Weight(growth percentile) SpO2  
 135/81 63 97.8 °F (36.6 °C) (Oral) 5' 9\" (1.753 m) 190 lb (86.2 kg) 98% BMI Smoking Status 28.06 kg/m2 Former Smoker BMI and BSA Data Body Mass Index Body Surface Area 28.06 kg/m 2 2.05 m 2 Preferred Pharmacy Pharmacy Name Phone [facility] [place], [address] [place] AT [facility] [phone] Your Updated Medication List  
  
   
This list is accurate as of: 2/20/17  8:43 AM.  Always use your most recent med list.  
  
  
  
  
 atorvastatin 80 mg tablet Commonly known as:  LIPITOR Take 1 Tab by mouth daily. Indications: mixed hyperlipidemia  
  
 hydrOXYzine pamoate 25 mg capsule Commonly known as:  VISTARIL Take 1 Cap by mouth three (3) times daily as needed for Anxiety for up to 14 days. naproxen 500 mg tablet Commonly known as:  NAPROSYN Take 1 Tab by mouth two (2) times daily (with meals). omeprazole 20 mg capsule Commonly known as:  PRILOSEC Take 1 Cap by mouth daily. Indications: GASTROESOPHAGEAL REFLUX  
  
 sertraline 25 mg tablet Commonly known as:  ZOLOFT Take 1 Tab by mouth daily. sildenafil citrate 25 mg tablet Commonly known as:  VIAGRA Take 1 Tab by mouth as needed. tamsulosin 0.4 mg capsule Commonly known as:  FLOMAX Take 2 Caps by mouth daily. Prescriptions Sent to Pharmacy Refills  
 tamsulosin (FLOMAX) 0.4 mg capsule 1 Sig: Take 2 Caps by mouth daily. Class: Normal  
 Pharmacy: [address] Ph #: [phone] Route: Oral  
 hydrOXYzine pamoate (VISTARIL) 25 mg capsule 0 Sig: Take 1 Cap by mouth three (3) times daily as needed for Anxiety for up to 14 days. Class: Normal  
 Pharmacy: [address] Ph #: [phone] Route: Oral  
 sildenafil citrate (VIAGRA) 25 mg tablet 0 Sig: Take 1 Tab by mouth as needed. Class: Normal  
 Pharmacy: [address] Ph #: [phone] Route: Oral  
  
We Performed the Following REFERRAL TO NEUROPSYCHOLOGY [BFB26 Custom] Follow-up Instructions Return in about 4 weeks (around 3/20/2017) for Depression. To-Do List   
 02/21/2017  9:30 AM  
(Arrive by 9:00 AM) Appointment with NON INVASIVE CARDIOLOGIST YAEL; STRESS LAB [facility] NON-INVASIVE CARD ([phone]) 1. Hold cardiac medications and BETA blockers for 24 hours. Call your physician with questions. 2.  Please bring a list of all current medications. 3. Do not eat or drink after midnight.  4. Please arrive wearing comfortable clothes suitable for exercise and flat, rubber-soled shoes, preferably athletic type. 5. Patients will be walked on a treadmill by the Cardiologist.\" 6. Patient will receive an IV. IF A PERSANITINE TEST Patient will NOT be walked on yetu. pharmalogical stress will be induced Referral Information Referral ID Referred By Referred To  
  
 [id number] Zara Model A Not Available Visits Status Start Date End Date 1 New Request 2/20/17 2/20/18 If your referral has a status of pending review or denied, additional information will be sent to support the outcome of this decision. Patient Instructions Dayna Castillo PSYD Psychologist in [place] Address: [address], [place], [address] [place] [address] [facility] Phone: [phone] 1. Follow up in [address] [place] [address] 2. We increased the dose of your Flomax to 2 pills (0.8mg) daily. 3. Try Atarax (hydroxizine) up to 3 times a day when needed for anxiety. 4. A referral for neurophsychological testing was sent for you. The doctors information is listed above. Benign Prostatic Hyperplasia: Care Instructions Your Care Instructions Benign prostatic hyperplasia, or BPH, is an enlarged prostate gland. The prostate is a small gland that makes some of the fluid in semen. Prostate enlargement happens to almost all men as they age. It is usually not serious. BPH does not cause prostate cancer. As the prostate gets bigger, it may partly block the flow of urine. You may have a hard time getting a urine stream started or completely stopped. BPH can cause dribbling. You may have a weak urine stream, or you may have to urinate more often than you used to, especially at night. Most men find these problems easy to manage. You do not need treatment unless your symptoms bother you a lot or you have other problems, such as bladder infections or stones.  In these cases, medicines may help. Surgery is not needed unless the urine flow is blocked or the symptoms do not get better with medicine. Follow-up care is a key part of your treatment and safety. Be sure to make and go to all appointments, and call your doctor if you are having problems. It's also a good idea to know your test results and keep a list of the medicines you take. How can you care for yourself at home? · Take plenty of time to urinate. Try to relax. · Try \"double voiding. \" Urinate as much you can, relax for a few moments, and then try to urinate again. · Sit on the toilet to urinate. · Read or think of other things while you are waiting. · Turn on a faucet, or try to picture running water. Some men find that this helps get their urine flowing. · If dribbling is a problem, wash your penis daily to avoid skin irritation and infection. · Avoid caffeine and alcohol. These drinks will increase how often you need to urinate. Spread your fluid intake throughout the day. If the urge to urinate often wakes you at night, limit your fluid intake in the evening. Urinate right before you go to bed. · Many over-the-counter cold and allergy medicines can make the symptoms of BPH worse. Avoid antihistamines, decongestants, and allergy pills, if you can. Read the warnings on the package. · If you take any prescription medicines, especially tranquilizers or antidepressants, ask your doctor or pharmacist whether they can cause urination problems. There may be other medicines you can use that do not cause urinary problems. · Be safe with medicines. Take your medicines exactly as prescribed. Call your doctor if you think you are having a problem with your medicine. When should you call for help? Call your doctor now or seek immediate medical care if: 
· You cannot urinate at all. · You have symptoms of a urinary infection. For example: ¨ You have blood or pus in your urine. ¨ You have pain in your back just below your rib cage. This is called flank pain. ¨ You have a fever, chills, or body aches. ¨ It hurts to urinate. ¨ You have groin or belly pain. Watch closely for changes in your health, and be sure to contact your doctor if: 
· It hurts when you ejaculate. · Your urinary problems get a lot worse or bother you a lot. Where can you learn more? Go to http://mary-clara.info/. Enter E720 in the search box to learn more about \"Benign Prostatic Hyperplasia: Care Instructions. \" Current as of: May 24, 2016 Content Version: 11.1 © 8459-7627 [employer]. Care instructions adapted under license by [employer] (which disclaims liability or warranty for this information). If you have questions about a medical condition or this instruction, always ask your healthcare professional. [place] [address] any warranty or liability for your use of this information. Introducing [facility] & HE[facility]! [facility] introduces Network[employer] patient portal. Now you can access parts of your medical record, email your doctor's office, and request medication refills online. 1. In your internet browser, go to [email or web address] 2. Click on the First Time User? Click Here link in the Sign In box. You will see the New Member Sign Up page. 3. Enter your [employer] Access Code exactly as it appears below. You will not need to use this code after youve completed the sign-up process. If you do not sign up before the expiration date, you must request a new code. · [employer] Access Code: [email or web address] Expires: 5/7/2017  8:40 AM 
 
4. Enter the last four digits of your Social Security Number (xxxx) and Date of Birth (mm/dd/yyyy) as indicated and click Submit. You will be taken to the next sign-up page. 5. Create a Network[employer] ID.  This will be your [employer] login ID and cannot be changed, so think of one that is secure and easy to remember. 6. Create a [employer] password. You can change your password at any time. 7. Enter your Password Reset Question and Answer. This can be used at a later time if you forget your password. 8. Enter your e-mail address. You will receive e-mail notification when new information is available in [address] 9. Click Sign Up. You can now view and download portions of your medical record. 10. Click the Download Summary menu link to download a portable copy of your medical information. If you have questions, please visit the Frequently Asked Questions section of the [employer] website. Remember, [employer] is NOT to be used for urgent needs. For medical emergencies, dial 911. Now available from your iPhone and Android! Please provide this summary of care documentation to your next provider. Your primary care clinician is listed as Kareem Higgins. If you have any questions after today's visit, please call [phone].

## 2017-02-20 NOTE — TELEPHONE ENCOUNTER
Per fax from pharmacy, a prior Zaida Wild is needed on the Viagra 25 MG tablets. Must use Cialis or be medically necessary for prior auth.  For prior auth contact [phone]

## 2017-02-20 NOTE — PATIENT INSTRUCTIONS
Dayna Gorman    Psychologist in [place]  Address: [address], [facility] [address] [facility]  Phone: [phone]      1. Follow up in [address]    2. We increased the dose of your Flomax to 2 pills (0.8mg) daily. 3. Try Atarax (hydroxizine) up to 3 times a day when needed for anxiety. 4. A referral for neurophsychological testing was sent for you. The doctors information is listed above. Benign Prostatic Hyperplasia: Care Instructions  Your Care Instructions    Benign prostatic hyperplasia, or BPH, is an enlarged prostate gland. The prostate is a small gland that makes some of the fluid in semen. Prostate enlargement happens to almost all men as they age. It is usually not serious. BPH does not cause prostate cancer. As the prostate gets bigger, it may partly block the flow of urine. You may have a hard time getting a urine stream started or completely stopped. BPH can cause dribbling. You may have a weak urine stream, or you may have to urinate more often than you used to, especially at night. Most men find these problems easy to manage. You do not need treatment unless your symptoms bother you a lot or you have other problems, such as bladder infections or stones. In these cases, medicines may help. Surgery is not needed unless the urine flow is blocked or the symptoms do not get better with medicine. Follow-up care is a key part of your treatment and safety. Be sure to make and go to all appointments, and call your doctor if you are having problems. It's also a good idea to know your test results and keep a list of the medicines you take. How can you care for yourself at home? · Take plenty of time to urinate. Try to relax. · Try \"double voiding. \" Urinate as much you can, relax for a few moments, and then try to urinate again. · Sit on the toilet to urinate. · Read or think of other things while you are waiting.   · Turn on a faucet, or try to picture running water. Some men find that this helps get their urine flowing. · If dribbling is a problem, wash your penis daily to avoid skin irritation and infection. · Avoid caffeine and alcohol. These drinks will increase how often you need to urinate. Spread your fluid intake throughout the day. If the urge to urinate often wakes you at night, limit your fluid intake in the evening. Urinate right before you go to bed. · Many over-the-counter cold and allergy medicines can make the symptoms of BPH worse. Avoid antihistamines, decongestants, and allergy pills, if you can. Read the warnings on the package. · If you take any prescription medicines, especially tranquilizers or antidepressants, ask your doctor or pharmacist whether they can cause urination problems. There may be other medicines you can use that do not cause urinary problems. · Be safe with medicines. Take your medicines exactly as prescribed. Call your doctor if you think you are having a problem with your medicine. When should you call for help? Call your doctor now or seek immediate medical care if:  · You cannot urinate at all. · You have symptoms of a urinary infection. For example:  ¨ You have blood or pus in your urine. ¨ You have pain in your back just below your rib cage. This is called flank pain. ¨ You have a fever, chills, or body aches. ¨ It hurts to urinate. ¨ You have groin or belly pain. Watch closely for changes in your health, and be sure to contact your doctor if:  · It hurts when you ejaculate. · Your urinary problems get a lot worse or bother you a lot. Where can you learn more? Go to http://mary-clara.info/. Enter I990 in the search box to learn more about \"Benign Prostatic Hyperplasia: Care Instructions. \"  Current as of: May 24, 2016  Content Version: 11.1  © 0335-5341 [employer].  Care instructions adapted under license by [employer] (which disclaims liability or warranty for this information). If you have questions about a medical condition or this instruction, always ask your healthcare professional. [place] [address] any warranty or liability for your use of this information.

## 2017-02-20 NOTE — TELEPHONE ENCOUNTER
Would it be ok for the patient to have Cialis instead? Viagra is requiring PA; and more than likely they wont cover it.

## 2017-02-20 NOTE — PROGRESS NOTES
Chief Complaint   Patient presents with    Medication Evaluation     lipitor/flomax/Zoloft     1. Have you been to the ER, urgent care clinic since your last visit? Hospitalized since your last visit? No    2. Have you seen or consulted any other health care providers outside of the [address] [place] [address] since your last visit? Include any pap smears or colon screening. No     flomax doesn't seem to be working--somewhat painful--going to the bathroom at night. Sleeping issues--goes to sleep for 3 hours then wakes up.

## 2017-02-20 NOTE — TELEPHONE ENCOUNTER
Spoke with patient and informed him of the medication change of viagra to Cialis due to affordability. Patient understood.

## 2017-02-21 ENCOUNTER — HOSPITAL ENCOUNTER (OUTPATIENT)
Dept: NON INVASIVE DIAGNOSTICS | Age: 47
Discharge: HOME | End: 2017-02-21
Attending: FAMILY MEDICINE
Payer: COMMERCIAL

## 2017-02-21 DIAGNOSIS — R07.9 CHEST PAIN, UNSPECIFIED TYPE: ICD-10-CM

## 2017-02-21 LAB
ATTENDING PHYSICIAN, CST07: NORMAL
DIAGNOSIS, 93000: NORMAL
DUKE TM SCORE RESULT, CST14: NORMAL
DUKE TREADMILL SCORE, CST13: NORMAL
ECG INTERP BEFORE EX, CST11: NORMAL
ECG INTERP DURING EX, CST12: NORMAL
FUNCTIONAL CAPACITY, CST17: NORMAL
KNOWN CARDIAC CONDITION, CST08: NORMAL
MAX. DIASTOLIC BP, CST04: 92 MMHG
MAX. HEART RATE, CST05: 169 BPM
MAX. SYSTOLIC BP, CST03: 182 MMHG
OVERALL BP RESPONSE TO EXERCISE, CST16: NORMAL
OVERALL HR RESPONSE TO EXERCISE, CST15: NORMAL
PEAK EX METS, CST10: 12 METS
PROTOCOL NAME, CST01: NORMAL
TEST INDICATION, CST09: NORMAL

## 2017-02-21 PROCEDURE — 93017 CV STRESS TEST TRACING ONLY: CPT

## 2017-03-19 DIAGNOSIS — F41.9 ANXIETY: ICD-10-CM

## 2017-03-20 RX ORDER — HYDROXYZINE PAMOATE 25 MG/1
CAPSULE ORAL
Qty: 30 CAP | Refills: 0 | Status: SHIPPED | OUTPATIENT
Start: 2017-03-20 | End: 2017-04-18 | Stop reason: SDUPTHER

## 2017-04-18 DIAGNOSIS — F41.9 ANXIETY: ICD-10-CM

## 2017-04-18 RX ORDER — HYDROXYZINE PAMOATE 25 MG/1
CAPSULE ORAL
Qty: 30 CAP | Refills: 0 | Status: SHIPPED | OUTPATIENT
Start: 2017-04-18 | End: 2017-05-05 | Stop reason: SDUPTHER

## 2017-05-05 DIAGNOSIS — F41.9 ANXIETY: ICD-10-CM

## 2017-05-05 NOTE — TELEPHONE ENCOUNTER
----- Message from Jose Maria IlaAlereon sent at 5/5/2017  8:52 AM EDT -----  Regarding: Dr. [facility]) requested refill on pt's Rx(\"Hydroxyzine Pamoate\" 25 mg capsules) Best contact number [phone].

## 2017-05-08 DIAGNOSIS — F41.9 ANXIETY: ICD-10-CM

## 2017-05-08 RX ORDER — HYDROXYZINE PAMOATE 25 MG/1
CAPSULE ORAL
Qty: 30 CAP | Refills: 1 | Status: SHIPPED | OUTPATIENT
Start: 2017-05-08 | End: 2017-06-23 | Stop reason: SDUPTHER

## 2017-05-08 RX ORDER — HYDROXYZINE PAMOATE 25 MG/1
CAPSULE ORAL
Qty: 30 CAP | Refills: 0 | Status: SHIPPED | OUTPATIENT
Start: 2017-05-08 | End: 2017-07-24 | Stop reason: SDUPTHER

## 2017-06-23 DIAGNOSIS — F41.9 ANXIETY: ICD-10-CM

## 2017-06-23 RX ORDER — HYDROXYZINE PAMOATE 25 MG/1
CAPSULE ORAL
Qty: 30 CAP | Refills: 0 | Status: SHIPPED | OUTPATIENT
Start: 2017-06-23 | End: 2017-07-27 | Stop reason: SDUPTHER

## 2017-07-24 DIAGNOSIS — F41.9 ANXIETY: ICD-10-CM

## 2017-07-27 RX ORDER — HYDROXYZINE PAMOATE 25 MG/1
CAPSULE ORAL
Qty: 30 CAP | Refills: 0 | Status: SHIPPED | OUTPATIENT
Start: 2017-07-27 | End: 2017-09-18 | Stop reason: SDUPTHER

## 2017-09-18 ENCOUNTER — TELEPHONE (OUTPATIENT)
Dept: FAMILY MEDICINE CLINIC | Age: 47
End: 2017-09-18

## 2017-09-18 DIAGNOSIS — R35.1 NOCTURIA: ICD-10-CM

## 2017-09-18 DIAGNOSIS — F41.9 ANXIETY: ICD-10-CM

## 2017-09-18 RX ORDER — TAMSULOSIN HYDROCHLORIDE 0.4 MG/1
0.8 CAPSULE ORAL DAILY
Qty: 180 CAP | Refills: 0 | Status: SHIPPED | OUTPATIENT
Start: 2017-09-18 | End: 2019-05-22

## 2017-09-18 RX ORDER — HYDROXYZINE PAMOATE 25 MG/1
CAPSULE ORAL
Qty: 30 CAP | Refills: 0 | Status: SHIPPED | OUTPATIENT
Start: 2017-09-18 | End: 2017-12-05 | Stop reason: SDUPTHER

## 2017-09-18 NOTE — TELEPHONE ENCOUNTER
Patient calling and need a refill on his prostrate medication. Request a 90 day supply. Patient doesn't know name of medication. Patient also asking for refill on anxiety medication ( 30 day supply). Patient states he's been off of medication for a while now and need medication as things are getting bad. Patient doesn't know name of medication.

## 2017-09-18 NOTE — TELEPHONE ENCOUNTER
Refilled request for anxiety medication (Vistaril) and prostate medication (Flomax).       Gregory Ruth MD

## 2017-11-21 ENCOUNTER — TELEPHONE (OUTPATIENT)
Dept: FAMILY MEDICINE CLINIC | Age: 47
End: 2017-11-21

## 2017-11-21 DIAGNOSIS — N52.9 ERECTILE DYSFUNCTION, UNSPECIFIED ERECTILE DYSFUNCTION TYPE: ICD-10-CM

## 2017-11-21 RX ORDER — TADALAFIL 5 MG/1
15 TABLET ORAL
Qty: 30 TAB | Refills: 3 | Status: SHIPPED | OUTPATIENT
Start: 2017-11-21 | End: 2019-03-11

## 2017-11-21 NOTE — TELEPHONE ENCOUNTER
Patient calling in for refill on CIALIS 10mg he would like to know when we refill it this time could we possibly increase the dosage for him? ALSO,  Patient is stating the hydroXYzine pamoate (VISTARIL) 25mg that he is taking 1 tab 3 times a day is making he sleepy is there something else he could take instead that wouldn't make him as sleepy? IF not he said he will just continue taking this as directed. Please contact patient with any further questions [phone], thank you.

## 2017-12-05 DIAGNOSIS — F41.9 ANXIETY: ICD-10-CM

## 2017-12-05 RX ORDER — HYDROXYZINE PAMOATE 25 MG/1
CAPSULE ORAL
Qty: 30 CAP | Refills: 0 | Status: SHIPPED | OUTPATIENT
Start: 2017-12-05 | End: 2018-01-30 | Stop reason: SDUPTHER

## 2018-01-30 DIAGNOSIS — F41.9 ANXIETY: ICD-10-CM

## 2018-01-30 RX ORDER — HYDROXYZINE PAMOATE 25 MG/1
CAPSULE ORAL
Qty: 30 CAP | Refills: 0 | Status: SHIPPED | OUTPATIENT
Start: 2018-01-30 | End: 2018-03-01 | Stop reason: SDUPTHER

## 2018-02-13 ENCOUNTER — OFFICE VISIT (OUTPATIENT)
Dept: FAMILY MEDICINE CLINIC | Age: 48
End: 2018-02-13

## 2018-02-13 VITALS
TEMPERATURE: 97.7 F | WEIGHT: 197.2 LBS | OXYGEN SATURATION: 95 % | SYSTOLIC BLOOD PRESSURE: 123 MMHG | RESPIRATION RATE: 18 BRPM | DIASTOLIC BLOOD PRESSURE: 83 MMHG | HEIGHT: 69 IN | HEART RATE: 72 BPM | BODY MASS INDEX: 29.21 KG/M2

## 2018-02-13 DIAGNOSIS — F32.1 MAJOR DEPRESSIVE DISORDER, SINGLE EPISODE, MODERATE (HCC): ICD-10-CM

## 2018-02-13 DIAGNOSIS — E78.49 OTHER HYPERLIPIDEMIA: ICD-10-CM

## 2018-02-13 DIAGNOSIS — R45.89 DEPRESSED MOOD: ICD-10-CM

## 2018-02-13 DIAGNOSIS — F41.9 ANXIETY: ICD-10-CM

## 2018-02-13 DIAGNOSIS — R39.198 DECREASED URINE STREAM: ICD-10-CM

## 2018-02-13 DIAGNOSIS — R25.2 MUSCLE CRAMP: ICD-10-CM

## 2018-02-13 DIAGNOSIS — G89.29 CHRONIC BILATERAL LOW BACK PAIN WITHOUT SCIATICA: Primary | ICD-10-CM

## 2018-02-13 DIAGNOSIS — M54.50 CHRONIC BILATERAL LOW BACK PAIN WITHOUT SCIATICA: Primary | ICD-10-CM

## 2018-02-13 DIAGNOSIS — M51.37 DEGENERATIVE DISC DISEASE AT L5-S1 LEVEL: ICD-10-CM

## 2018-02-13 LAB
BILIRUB UR QL STRIP: NEGATIVE
GLUCOSE UR-MCNC: NEGATIVE MG/DL
KETONES P FAST UR STRIP-MCNC: NEGATIVE MG/DL
PH UR STRIP: 5.5 [PH] (ref 4.6–8)
PROT UR QL STRIP: NEGATIVE
SP GR UR STRIP: 1.01 (ref 1–1.03)
UA UROBILINOGEN AMB POC: NORMAL (ref 0.2–1)
URINALYSIS CLARITY POC: CLEAR
URINALYSIS COLOR POC: YELLOW
URINE BLOOD POC: NEGATIVE
URINE LEUKOCYTES POC: NEGATIVE
URINE NITRITES POC: NEGATIVE

## 2018-02-13 RX ORDER — IBUPROFEN 800 MG/1
800 TABLET ORAL
Qty: 30 TAB | Refills: 0 | Status: SHIPPED | OUTPATIENT
Start: 2018-02-13 | End: 2019-03-11

## 2018-02-13 RX ORDER — SERTRALINE HYDROCHLORIDE 25 MG/1
25 TABLET, FILM COATED ORAL DAILY
Qty: 30 TAB | Refills: 1 | Status: SHIPPED | OUTPATIENT
Start: 2018-02-13 | End: 2018-03-19 | Stop reason: SDUPTHER

## 2018-02-13 RX ORDER — CYCLOBENZAPRINE HCL 5 MG
5 TABLET ORAL
Qty: 30 TAB | Refills: 0 | Status: SHIPPED | OUTPATIENT
Start: 2018-02-13 | End: 2019-03-11

## 2018-02-13 NOTE — PATIENT INSTRUCTIONS
[address] [facility]  Dr. Aldridge Model  [phone]         Benign Prostatic Hyperplasia: Care Instructions  Your Care Instructions    Benign prostatic hyperplasia, or BPH, is an enlarged prostate gland. The prostate is a small gland that makes some of the fluid in semen. Prostate enlargement happens to almost all men as they age. It is usually not serious. BPH does not cause prostate cancer. As the prostate gets bigger, it may partly block the flow of urine. You may have a hard time getting a urine stream started or completely stopped. BPH can cause dribbling. You may have a weak urine stream, or you may have to urinate more often than you used to, especially at night. Most men find these problems easy to manage. You do not need treatment unless your symptoms bother you a lot or you have other problems, such as bladder infections or stones. In these cases, medicines may help. Surgery is not needed unless the urine flow is blocked or the symptoms do not get better with medicine. Follow-up care is a key part of your treatment and safety. Be sure to make and go to all appointments, and call your doctor if you are having problems. It's also a good idea to know your test results and keep a list of the medicines you take. How can you care for yourself at home? · Take plenty of time to urinate. Try to relax. · Try \"double voiding. \" Urinate as much you can, relax for a few moments, and then try to urinate again. · Sit on the toilet to urinate. · Read or think of other things while you are waiting. · Turn on a faucet, or try to picture running water. Some men find that this helps get their urine flowing. · If dribbling is a problem, wash your penis daily to avoid skin irritation and infection. · Avoid caffeine and alcohol. These drinks will increase how often you need to urinate. Spread your fluid intake throughout the day. If the urge to urinate often wakes you at night, limit your fluid intake in the evening.  Urinate right before you go to bed. · Many over-the-counter cold and allergy medicines can make the symptoms of BPH worse. Avoid antihistamines, decongestants, and allergy pills, if you can. Read the warnings on the package. · If you take any prescription medicines, especially tranquilizers or antidepressants, ask your doctor or pharmacist whether they can cause urination problems. There may be other medicines you can use that do not cause urinary problems. · Be safe with medicines. Take your medicines exactly as prescribed. Call your doctor if you think you are having a problem with your medicine. When should you call for help? Call your doctor now or seek immediate medical care if:  ? · You cannot urinate at all. ? · You have symptoms of a urinary infection. For example:  ¨ You have blood or pus in your urine. ¨ You have pain in your back just below your rib cage. This is called flank pain. ¨ You have a fever, chills, or body aches. ¨ It hurts to urinate. ¨ You have groin or belly pain. ? Watch closely for changes in your health, and be sure to contact your doctor if:  ? · It hurts when you ejaculate. ? · Your urinary problems get a lot worse or bother you a lot. Where can you learn more? Go to http://mary-clara.info/. Enter W593 in the search box to learn more about \"Benign Prostatic Hyperplasia: Care Instructions. \"  Current as of: March 14, 2017  Content Version: 11.4  © 6036-2783 [employer]. Care instructions adapted under license by [employer] (which disclaims liability or warranty for this information). If you have questions about a medical condition or this instruction, always ask your healthcare professional. [place] [address] any warranty or liability for your use of this information.

## 2018-02-13 NOTE — PROGRESS NOTES
Chief Complaint   Patient presents with    Back Pain     x several months, left lower back area     1. Have you been to the ER, urgent care clinic since your last visit? Hospitalized since your last visit? No    2. Have you seen or consulted any other health care providers outside of the [address] [place] [address] since your last visit? Include any pap smears or colon screening.  No

## 2018-02-13 NOTE — MR AVS SNAPSHOT
[address] 
[phone] Patient: Regan Contreras MRN: [id number]:[id number] Visit Information Date & Time Provider Department Dept. Phone Encounter #  
 2/13/2018  8:00 AM Mandi Espana, Arsen Hung Chand [phone] [id number] Follow-up Instructions Return in about 4 weeks (around 3/13/2018) for Annual Wellness Visit. Upcoming Health Maintenance Date Due Influenza Age 5 to Adult 8/1/2017 DTaP/Tdap/Td series (2 - Td) 4/4/2026 Allergies as of 2/13/2018  Review Complete On: 2/13/2018 By: Peg Hodge No Known Allergies Current Immunizations  Reviewed on 2/6/2017 Name Date Influenza Nasal Vaccine 10/27/2015 Influenza Vaccine (Quad) PF 2/6/2017 Pneumococcal Polysaccharide (PPSV-23) 2/20/2017 Not reviewed this visit You Were Diagnosed With   
  
 Codes Comments Chronic bilateral low back pain without sciatica    -  Primary ICD-10-CM: M54.5, G89.29 ICD-9-CM: 724.2, 338.29 Decreased urine stream     ICD-10-CM: R39.198 ICD-9-CM: 962.20 Degenerative disc disease at L5-S1 level     ICD-10-CM: M51.36 
ICD-9-CM: 722.52 Anxiety     ICD-10-CM: F41.9 ICD-9-CM: 300.00 Major depressive disorder, single episode, moderate (HCC)     ICD-10-CM: F32.1 ICD-9-CM: 296.22 Other hyperlipidemia     ICD-10-CM: E78.4 ICD-9-CM: 272.4 Depressed mood     ICD-10-CM: F32.9 ICD-9-CM: 603 Muscle cramp     ICD-10-CM: R25.2 ICD-9-CM: 729.82 Vitals BP Pulse Temp Resp Height(growth percentile) Weight(growth percentile) 123/83 72 97.7 °F (36.5 °C) (Oral) 18 5' 9\" (1.753 m) 197 lb 3.2 oz (89.4 kg) SpO2 BMI Smoking Status 95% 29.12 kg/m2 Former Smoker Vitals History BMI and BSA Data Body Mass Index Body Surface Area  
 29.12 kg/m 2 2.09 m 2 Preferred Pharmacy Pharmacy Name Phone [facility] [address] [place] [address], [address] [place] [address] [phone] Your Updated Medication List  
  
   
This list is accurate as of: 2/13/18  8:32 AM.  Always use your most recent med list.  
  
  
  
  
 atorvastatin 80 mg tablet Commonly known as:  LIPITOR Take 1 Tab by mouth daily. Indications: mixed hyperlipidemia  
  
 cyclobenzaprine 5 mg tablet Commonly known as:  FLEXERIL Take 1 Tab by mouth nightly as needed for Muscle Spasm(s). hydrOXYzine pamoate 25 mg capsule Commonly known as:  VISTARIL  
TAKE 1 CAPSULE BY MOUTH THREE TIMES DAILY AS NEEDED FOR ANXIETY  
  
 ibuprofen 800 mg tablet Commonly known as:  MOTRIN Take 1 Tab by mouth every eight (8) hours as needed for Pain. sertraline 25 mg tablet Commonly known as:  ZOLOFT Take 1 Tab by mouth daily. tadalafil 5 mg tablet Commonly known as:  CIALIS Take 3 Tabs by mouth daily as needed. tamsulosin 0.4 mg capsule Commonly known as:  FLOMAX Take 2 Caps by mouth daily. Prescriptions Sent to Pharmacy Refills  
 sertraline (ZOLOFT) 25 mg tablet 1 Sig: Take 1 Tab by mouth daily. Class: Normal  
 Pharmacy: [address] Ph #: [phone] Route: Oral  
 ibuprofen (MOTRIN) 800 mg tablet 0 Sig: Take 1 Tab by mouth every eight (8) hours as needed for Pain. Class: Normal  
 Pharmacy: [address] Ph #: [phone] Route: Oral  
 cyclobenzaprine (FLEXERIL) 5 mg tablet 0 Sig: Take 1 Tab by mouth nightly as needed for Muscle Spasm(s). Class: Normal  
 Pharmacy: [address] Ph #: [phone] Route: Oral  
  
We Performed the Following AMB POC URINALYSIS DIP STICK AUTO W/O MICRO [46612 CPT(R)] CBC W/O DIFF [67173 CPT(R)] LIPID PANEL [56992 CPT(R)] METABOLIC PANEL, COMPREHENSIVE [01765 CPT(R)] REFERRAL TO ORTHOPEDIC SURGERY [REF62 Custom] Comments:  
 Chronic low back pain with hx of DDD; benefited from [facility] years ago. REFERRAL TO PHYSICAL THERAPY [NWK70 Custom] Comments:  
 Please refer to Rachel Pedroza PT. Low back pain. Eval and treat. ROM, flexibility and strengthening (viola core strengthening). Modalities as needed. 2x/wk for 8 wks. REFERRAL TO UROLOGY [ZED359 Custom] Comments:  
 Possible BPH. Difficulty with urination. Follow-up Instructions Return in about 4 weeks (around 3/13/2018) for Annual Wellness Visit. Referral Information Referral ID Referred By Referred To  
  
 [id number] Rizwana Harper, [address] [facility] [address] [place], [address] [facility] Phone: [phone] Fax: [phone] Visits Status Start Date End Date 1 New Request 2/13/18 2/13/19 If your referral has a status of pending review or denied, additional information will be sent to support the outcome of this decision. Referral ID Referred By Referred To  
 [id number] Alyce Worrell [facility] [address], [address] [facility] Phone: [phone] Fax: [phone] Visits Status Start Date End Date 1 New Request 2/13/18 2/13/19 If your referral has a status of pending review or denied, additional information will be sent to support the outcome of this decision. Referral ID Referred By Referred To  
 [id number] [facility] [address]  
   [place], [address] Visits Status Start Date End Date 1 New Request 2/13/18 2/13/19 If your referral has a status of pending review or denied, additional information will be sent to support the outcome of this decision. Patient Instructions [facility] Dr. Jaxon Aguayo 
[phone] Benign Prostatic Hyperplasia: Care Instructions Your Care Instructions Benign prostatic hyperplasia, or BPH, is an enlarged prostate gland. The prostate is a small gland that makes some of the fluid in semen. Prostate enlargement happens to almost all men as they age. It is usually not serious. BPH does not cause prostate cancer. As the prostate gets bigger, it may partly block the flow of urine. You may have a hard time getting a urine stream started or completely stopped. BPH can cause dribbling. You may have a weak urine stream, or you may have to urinate more often than you used to, especially at night. Most men find these problems easy to manage. You do not need treatment unless your symptoms bother you a lot or you have other problems, such as bladder infections or stones. In these cases, medicines may help. Surgery is not needed unless the urine flow is blocked or the symptoms do not get better with medicine. Follow-up care is a key part of your treatment and safety. Be sure to make and go to all appointments, and call your doctor if you are having problems. It's also a good idea to know your test results and keep a list of the medicines you take. How can you care for yourself at home? · Take plenty of time to urinate. Try to relax. · Try \"double voiding. \" Urinate as much you can, relax for a few moments, and then try to urinate again. · Sit on the toilet to urinate. · Read or think of other things while you are waiting. · Turn on a faucet, or try to picture running water. Some men find that this helps get their urine flowing. · If dribbling is a problem, wash your penis daily to avoid skin irritation and infection. · Avoid caffeine and alcohol. These drinks will increase how often you need to urinate. Spread your fluid intake throughout the day. If the urge to urinate often wakes you at night, limit your fluid intake in the evening. Urinate right before you go to bed. · Many over-the-counter cold and allergy medicines can make the symptoms of BPH worse. Avoid antihistamines, decongestants, and allergy pills, if you can. Read the warnings on the package. · If you take any prescription medicines, especially tranquilizers or antidepressants, ask your doctor or pharmacist whether they can cause urination problems. There may be other medicines you can use that do not cause urinary problems. · Be safe with medicines. Take your medicines exactly as prescribed. Call your doctor if you think you are having a problem with your medicine. When should you call for help? Call your doctor now or seek immediate medical care if: 
? · You cannot urinate at all. ? · You have symptoms of a urinary infection. For example: ¨ You have blood or pus in your urine. ¨ You have pain in your back just below your rib cage. This is called flank pain. ¨ You have a fever, chills, or body aches. ¨ It hurts to urinate. ¨ You have groin or belly pain. ? Watch closely for changes in your health, and be sure to contact your doctor if: 
? · It hurts when you ejaculate. ? · Your urinary problems get a lot worse or bother you a lot. Where can you learn more? Go to http://mary-clara.info/. Enter D504 in the search box to learn more about \"Benign Prostatic Hyperplasia: Care Instructions. \" Current as of: March 14, 2017 Content Version: 11.4 © 0228-2433 [employer]. Care instructions adapted under license by [employer] (which disclaims liability or warranty for this information). If you have questions about a medical condition or this instruction, always ask your healthcare professional. [place] [address] any warranty or liability for your use of this information. Introducing [facility] HE[facility]!    
 [employer] introduces [employer] patient portal. Now you can access parts of your medical record, email your doctor's office, and request medication refills online. 1. In your internet browser, go to [email or web address] "[employer]"/AMCAD 2. Click on the First Time User? Click Here link in the Sign In box. You will see the New Member Sign Up page. 3. Enter your [employer] Access Code exactly as it appears below. You will not need to use this code after youve completed the sign-up process. If you do not sign up before the expiration date, you must request a new code. · [employer] Access Code: [email or web address] Expires: 5/14/2018  8:32 AM 
 
4. Enter the last four digits of your Social Security Number (xxxx) and Date of Birth (mm/dd/yyyy) as indicated and click Submit. You will be taken to the next sign-up page. 5. Create a [employer] ID. This will be your [employer] login ID and cannot be changed, so think of one that is secure and easy to remember. 6. Create a [employer] password. You can change your password at any time. 7. Enter your Password Reset Question and Answer. This can be used at a later time if you forget your password. 8. Enter your e-mail address. You will receive e-mail notification when new information is available in [address] 9. Click Sign Up. You can now view and download portions of your medical record. 10. Click the Download Summary menu link to download a portable copy of your medical information. If you have questions, please visit the Frequently Asked Questions section of the [employer] website. Remember, [employer] is NOT to be used for urgent needs. For medical emergencies, dial 911. Now available from your iPhone and Android! Please provide this summary of care documentation to your next provider. Your primary care clinician is listed as Cassidy Golden. If you have any questions after today's visit, please call [phone].

## 2018-02-13 NOTE — PROGRESS NOTES
History of Present Illness:     Chief Complaint   Patient presents with    Back Pain     x several months, left lower back area     Pt is a 52y.o. year old male    Presents to clinic for low back pain. Has a history of DDD at L5-S1 and chronic low back pain. Pain is mostly on the left, lower back. Pain radiates down the thigh. Using a heating pad and Tylenol. Having a lot of cramping. He has had steroid injections by Dr. Olivia Nevarez. Did PT some years ago. HLD - Not taking the Lipitor. MDD and anxiety - Taking Vistaril as needed and usually at bedtime. Not taking Zoloft, felt like it was helping but since stopped taking. Feels like his symptoms are OK but now has a more stressful job. Having urinary issues. Waking 4-6 times per night. Took Flomax for some time but stopped. Stream is low. Not taking Flomax. Denies dysuria and hematuria. +Urinary frequency. Past Medical History:   Diagnosis Date    DJD (degenerative joint disease) of cervical spine          Current Outpatient Prescriptions on File Prior to Visit   Medication Sig Dispense Refill    hydrOXYzine pamoate (VISTARIL) 25 mg capsule TAKE 1 CAPSULE BY MOUTH THREE TIMES DAILY AS NEEDED FOR ANXIETY 30 Cap 0    tadalafil (CIALIS) 5 mg tablet Take 3 Tabs by mouth daily as needed. 30 Tab 3    tamsulosin (FLOMAX) 0.4 mg capsule Take 2 Caps by mouth daily. 180 Cap 0    atorvastatin (LIPITOR) 80 mg tablet Take 1 Tab by mouth daily. Indications: mixed hyperlipidemia 30 Tab 3     No current facility-administered medications on file prior to visit.           Allergies:  No Known Allergies      Review of Systems:  Denies fever, chills, sweats  Denies chest pain, MASON, palpitations  Denies cough, SOB      Objective:     Vitals:    02/13/18 0802   BP: 123/83   Pulse: 72   Resp: 18   Temp: 97.7 °F (36.5 °C)   TempSrc: Oral   SpO2: 95%   Weight: 197 lb 3.2 oz (89.4 kg)   Height: 5' 9\" (1.753 m)       Physical Exam:  General appearance - alert, well appearing, and in no distress, less anxious than usual  Chest - clear to auscultation, no wheezes, rales or rhonchi, symmetric air entry  Heart - normal rate, regular rhythm, normal S1, S2, no murmurs, rubs, clicks or gallops  Back exam - Bilateral TTP along paraspinous muscles with palpable spasm on the left. No TTP along spinous processes. Neuro - normal reflexes and strength bilateral lower extremities, sensory exam intact bilateral lower extremities      Recent Labs:  Recent Results (from the past 12 hour(s))   AMB POC URINALYSIS DIP STICK AUTO W/O MICRO    Collection Time: 02/13/18  8:43 AM   Result Value Ref Range    Color (UA POC) Yellow     Clarity (UA POC) Clear     Glucose (UA POC) Negative Negative    Bilirubin (UA POC) Negative Negative    Ketones (UA POC) Negative Negative    Specific gravity (UA POC) 1.015 1.001 - 1.035    Blood (UA POC) Negative Negative    pH (UA POC) 5.5 4.6 - 8.0    Protein (UA POC) Negative Negative    Urobilinogen (UA POC) 0.2 mg/dL 0.2 - 1    Nitrites (UA POC) Negative Negative    Leukocyte esterase (UA POC) Negative Negative         Assessment and Plan:   Pt is a 52y.o. year old male,      ICD-10-CM ICD-9-CM    1. Chronic bilateral low back pain without sciatica M54.5 724.2 REFERRAL TO PHYSICAL THERAPY    G89.29 338.29 REFERRAL TO ORTHOPEDIC SURGERY      CBC W/O DIFF      METABOLIC PANEL, COMPREHENSIVE      ibuprofen (MOTRIN) 800 mg tablet      cyclobenzaprine (FLEXERIL) 5 mg tablet   2. Decreased urine stream R39.198 788.62 REFERRAL TO UROLOGY      AMB POC URINALYSIS DIP STICK AUTO W/O MICRO   3. Degenerative disc disease at L5-S1 level M51.36 722.52    4. Anxiety F41.9 300.00    5. Major depressive disorder, single episode, moderate (HCC) F32.1 296.22    6. Other hyperlipidemia E78.4 272.4 CBC W/O DIFF      METABOLIC PANEL, COMPREHENSIVE      LIPID PANEL   7. Depressed mood F32.9 311 sertraline (ZOLOFT) 25 mg tablet   8.  Muscle cramp R25.2 729.82 CBC W/O DIFF METABOLIC PANEL, COMPREHENSIVE      cyclobenzaprine (FLEXERIL) 5 mg tablet     Back pain  - Motrin and Flexeril for back pain  - PT referral  - Referred back to Dr. Randy Marks for possible CHANTELLE    HLD  - Encouraged to resume Lipitor  - Recheck lipids today    Nocturia  - Sx concerning for BPH  - Had relief with Flomax but stopped taking  - Referred to Urology again  - UA WNL    MDD/ Anxiety   - Encouraged to resume Zoloft for anxiety as it has helped in the past  - Cont Visteril PRN anxiety    Follow up in 4-6 wks for annual wellness    Arleth George MD      I have discussed the diagnosis with the patient and the intended plan as seen in the above orders. The patient has received an after-visit summary and questions were answered concerning future plans.

## 2018-02-14 LAB
ALBUMIN SERPL-MCNC: 4.3 G/DL (ref 3.5–5.5)
ALBUMIN/GLOB SERPL: 1.9 {RATIO} (ref 1.2–2.2)
ALP SERPL-CCNC: 53 IU/L (ref 39–117)
ALT SERPL-CCNC: 62 IU/L (ref 0–44)
AST SERPL-CCNC: 35 IU/L (ref 0–40)
BILIRUB SERPL-MCNC: 0.2 MG/DL (ref 0–1.2)
BUN SERPL-MCNC: 18 MG/DL (ref 6–24)
BUN/CREAT SERPL: 14 (ref 9–20)
CALCIUM SERPL-MCNC: 9.1 MG/DL (ref 8.7–10.2)
CHLORIDE SERPL-SCNC: 104 MMOL/L (ref 96–106)
CHOLEST SERPL-MCNC: 292 MG/DL (ref 100–199)
CO2 SERPL-SCNC: 21 MMOL/L (ref 18–29)
CREAT SERPL-MCNC: 1.3 MG/DL (ref 0.76–1.27)
ERYTHROCYTE [DISTWIDTH] IN BLOOD BY AUTOMATED COUNT: 14.1 % (ref 12.3–15.4)
GFR SERPLBLD CREATININE-BSD FMLA CKD-EPI: 65 ML/MIN/1.73
GFR SERPLBLD CREATININE-BSD FMLA CKD-EPI: 75 ML/MIN/1.73
GLOBULIN SER CALC-MCNC: 2.3 G/DL (ref 1.5–4.5)
GLUCOSE SERPL-MCNC: 105 MG/DL (ref 65–99)
HCT VFR BLD AUTO: 40.9 % (ref 37.5–51)
HDLC SERPL-MCNC: 37 MG/DL
HGB BLD-MCNC: 14 G/DL (ref 13–17.7)
INTERPRETATION, 910389: NORMAL
LDLC SERPL CALC-MCNC: ABNORMAL MG/DL (ref 0–99)
MCH RBC QN AUTO: 31.4 PG (ref 26.6–33)
MCHC RBC AUTO-ENTMCNC: 34.2 G/DL (ref 31.5–35.7)
MCV RBC AUTO: 92 FL (ref 79–97)
PDF IMAGE, 910387: NORMAL
PLATELET # BLD AUTO: 243 X10E3/UL (ref 150–379)
POTASSIUM SERPL-SCNC: 4.7 MMOL/L (ref 3.5–5.2)
PROT SERPL-MCNC: 6.6 G/DL (ref 6–8.5)
RBC # BLD AUTO: 4.46 X10E6/UL (ref 4.14–5.8)
SODIUM SERPL-SCNC: 141 MMOL/L (ref 134–144)
TRIGL SERPL-MCNC: 583 MG/DL (ref 0–149)
VLDLC SERPL CALC-MCNC: ABNORMAL MG/DL (ref 5–40)
WBC # BLD AUTO: 7.1 X10E3/UL (ref 3.4–10.8)

## 2018-02-15 NOTE — PROGRESS NOTES
Normal CBC  Lipids elevated with TGs in the moderate range  LDL could not be calculated with current lipid panel; however, prior 10 yr ASCVD risk elevated and very likely that remains elevated  Pt not compliant with statin  Will recommend statin compliance, diet/ exercise and repeat fasting labs in 3 months  Will have patient called with results

## 2018-02-17 ENCOUNTER — TELEPHONE (OUTPATIENT)
Dept: FAMILY MEDICINE CLINIC | Age: 48
End: 2018-02-17

## 2018-02-17 NOTE — TELEPHONE ENCOUNTER
Called and spoke with patient per Dr Patricia Lockhart, informed patient that his cholesterol is high. His triglycerides are higher than when it was checked fasting the last time. He really needs to work on weight loss and start taking the cholesterol medication that Dr Patricia Lockhart prescribed. Informed patient to make sure he come fasting for his physical next month so his cholesterol can get rechecked, with the possibility of it being better when fasting. Informed him that his other labs look good. Patient verbalized understanding and stated he will start taking his medication.

## 2018-03-01 DIAGNOSIS — F41.9 ANXIETY: ICD-10-CM

## 2018-03-05 RX ORDER — HYDROXYZINE PAMOATE 25 MG/1
CAPSULE ORAL
Qty: 30 CAP | Refills: 0 | Status: SHIPPED | OUTPATIENT
Start: 2018-03-05 | End: 2018-04-18 | Stop reason: SDUPTHER

## 2018-03-16 ENCOUNTER — TELEPHONE (OUTPATIENT)
Dept: FAMILY MEDICINE CLINIC | Age: 48
End: 2018-03-16

## 2018-03-16 DIAGNOSIS — R45.89 DEPRESSED MOOD: ICD-10-CM

## 2018-03-19 RX ORDER — SERTRALINE HYDROCHLORIDE 25 MG/1
25 TABLET, FILM COATED ORAL DAILY
Qty: 90 TAB | Refills: 1 | Status: SHIPPED | OUTPATIENT
Start: 2018-03-19 | End: 2019-03-11

## 2018-04-18 DIAGNOSIS — F41.9 ANXIETY: ICD-10-CM

## 2018-04-18 RX ORDER — HYDROXYZINE PAMOATE 25 MG/1
CAPSULE ORAL
Qty: 30 CAP | Refills: 0 | Status: SHIPPED | OUTPATIENT
Start: 2018-04-18 | End: 2018-05-16 | Stop reason: SDUPTHER

## 2018-05-07 ENCOUNTER — TELEPHONE (OUTPATIENT)
Dept: FAMILY MEDICINE CLINIC | Age: 48
End: 2018-05-07

## 2018-05-16 DIAGNOSIS — F41.9 ANXIETY: ICD-10-CM

## 2018-05-16 RX ORDER — HYDROXYZINE PAMOATE 25 MG/1
CAPSULE ORAL
Qty: 30 CAP | Refills: 0 | Status: SHIPPED | OUTPATIENT
Start: 2018-05-16 | End: 2018-06-12 | Stop reason: SDUPTHER

## 2018-06-12 DIAGNOSIS — F41.9 ANXIETY: ICD-10-CM

## 2018-06-13 RX ORDER — HYDROXYZINE PAMOATE 25 MG/1
CAPSULE ORAL
Qty: 30 CAP | Refills: 0 | Status: SHIPPED | OUTPATIENT
Start: 2018-06-13 | End: 2018-07-17 | Stop reason: SDUPTHER

## 2018-06-27 ENCOUNTER — TELEPHONE (OUTPATIENT)
Dept: FAMILY MEDICINE CLINIC | Age: 48
End: 2018-06-27

## 2018-06-27 NOTE — TELEPHONE ENCOUNTER
/Telephone  Received: Today       Enma ABARCA [facility] Front [facility]                     Pt would like to get a coloscopy and endoscopy done.      Pt will need a recommendation to a specialist     Best contact is [phone]

## 2018-06-28 ENCOUNTER — TELEPHONE (OUTPATIENT)
Dept: FAMILY MEDICINE CLINIC | Age: 48
End: 2018-06-28

## 2018-07-13 DIAGNOSIS — F41.9 ANXIETY: ICD-10-CM

## 2018-07-13 RX ORDER — HYDROXYZINE PAMOATE 25 MG/1
CAPSULE ORAL
Qty: 30 CAP | Refills: 0 | OUTPATIENT
Start: 2018-07-13

## 2018-07-17 RX ORDER — HYDROXYZINE PAMOATE 25 MG/1
CAPSULE ORAL
Qty: 30 CAP | Refills: 0 | Status: SHIPPED | OUTPATIENT
Start: 2018-07-17 | End: 2019-03-11

## 2018-07-18 ENCOUNTER — OFFICE VISIT (OUTPATIENT)
Dept: FAMILY MEDICINE CLINIC | Age: 48
End: 2018-07-18

## 2018-07-18 VITALS
TEMPERATURE: 97 F | SYSTOLIC BLOOD PRESSURE: 122 MMHG | OXYGEN SATURATION: 97 % | HEIGHT: 69 IN | BODY MASS INDEX: 29.92 KG/M2 | RESPIRATION RATE: 16 BRPM | WEIGHT: 202 LBS | DIASTOLIC BLOOD PRESSURE: 91 MMHG | HEART RATE: 70 BPM

## 2018-07-18 DIAGNOSIS — K60.2 ANAL FISSURE: ICD-10-CM

## 2018-07-18 DIAGNOSIS — K62.5 BLEEDING PER RECTUM: ICD-10-CM

## 2018-07-18 DIAGNOSIS — K21.9 GASTROESOPHAGEAL REFLUX DISEASE, ESOPHAGITIS PRESENCE NOT SPECIFIED: Primary | ICD-10-CM

## 2018-07-18 DIAGNOSIS — K59.04 CHRONIC IDIOPATHIC CONSTIPATION: ICD-10-CM

## 2018-07-18 RX ORDER — ASPIRIN 81 MG
100 TABLET, DELAYED RELEASE (ENTERIC COATED) ORAL DAILY
Qty: 30 TAB | Refills: 3 | Status: SHIPPED | OUTPATIENT
Start: 2018-07-18 | End: 2019-08-29

## 2018-07-18 RX ORDER — PANTOPRAZOLE SODIUM 40 MG/1
40 TABLET, DELAYED RELEASE ORAL DAILY
Qty: 30 TAB | Refills: 3 | Status: SHIPPED | OUTPATIENT
Start: 2018-07-18 | End: 2020-06-17 | Stop reason: SDUPTHER

## 2018-07-18 RX ORDER — POLYETHYLENE GLYCOL 3350 17 G/17G
17 POWDER, FOR SOLUTION ORAL DAILY
Qty: 30 PACKET | Refills: 2 | Status: SHIPPED | OUTPATIENT
Start: 2018-07-18 | End: 2021-05-28

## 2018-07-18 NOTE — PROGRESS NOTES
[facility]    Subjective:   Rodriguez Padgett is a 52 y.o. male with history of GERD, Constipation, anal fissures, Depression  CC: Heart burn/abdominal pain  History provided by patient and     HPI:  Patient with history of GERD. Patient reports over the last 3 days noting increased issues with epigastric/abdominal burning sensation. Noting some phlegm spitting and nausea intermittently. Burning starting on waking and noting nausea predominantly in the , denies sour brash. Not sure if related to food, but symptoms do improve with TUMs. Had tried old prescription of Prilosec but greater than 1years old. Patient uses Motrin 800 mg 1-2 times a week. Patient denies any chest pain at this time. Pain is not related to exercise/activity, denies chest pressure, SOB. These symptoms are similar to previous GERD, previous work up more than 10 years ago with GI with EGD and colonoscopy and started on Acid suppression. Patient also notes that he has had straining with BM and hard stools. Noting extreme pain with BM and noting blood on stool or toilet paper. History of anal fissure, noting that bleeding has been about the same for months, but straining and pain has worsened. Previously on bowel regimen (Fiber supplements), but stopped on own. Depression: Stable overall, on Zoloft    PFSH:   - Patient drinks a 12 pack/week  - Occasional Smoke      Current Outpatient Prescriptions on File Prior to Visit   Medication Sig Dispense Refill    sertraline (ZOLOFT) 25 mg tablet Take 1 Tab by mouth daily. 90 Tab 1    ibuprofen (MOTRIN) 800 mg tablet Take 1 Tab by mouth every eight (8) hours as needed for Pain. 30 Tab 0    tamsulosin (FLOMAX) 0.4 mg capsule Take 2 Caps by mouth daily.  180 Cap 0    hydrOXYzine pamoate (VISTARIL) 25 mg capsule TAKE 1 CAPSULE BY MOUTH THREE TIMES DAILY AS NEEDED FOR ANXIETY 30 Cap 0    cyclobenzaprine (FLEXERIL) 5 mg tablet Take 1 Tab by mouth nightly as needed for Muscle Spasm(s). 30 Tab 0    tadalafil (CIALIS) 5 mg tablet Take 3 Tabs by mouth daily as needed. 30 Tab 3    atorvastatin (LIPITOR) 80 mg tablet Take 1 Tab by mouth daily. Indications: mixed hyperlipidemia 30 Tab 3     No current facility-administered medications on file prior to visit. Patient Active Problem List   Diagnosis Code    HLD (hyperlipidemia) E78.5    Chronic lower back pain M54.5, G89.29    Degenerative disc disease at L5-S1 level M51.36    Major depressive disorder, single episode, moderate (HCC) F32.1    Anxiety F41.9       Social History     Social History    Marital status:      Spouse name: N/A    Number of children: N/A    Years of education: N/A     Occupational History    Not on file. Social History Main Topics    Smoking status: Former Smoker     Quit date: 11/2/2000    Smokeless tobacco: Never Used    Alcohol use Yes      Comment: socially    Drug use: No    Sexual activity: Yes     Other Topics Concern    Not on file     Social History Narrative       Review of Systems   Constitutional: Negative for chills, fever and malaise/fatigue. Respiratory: Negative for shortness of breath. Cardiovascular: Negative for chest pain and palpitations. Gastrointestinal: Positive for abdominal pain, blood in stool, constipation, heartburn and nausea. Negative for vomiting. Neurological: Negative for dizziness and headaches. Objective:     Visit Vitals    BP (!) 122/91    Pulse 70    Temp 97 °F (36.1 °C) (Oral)    Resp 16    Ht 5' 9\" (1.753 m)    Wt 202 lb (91.6 kg)    SpO2 97%    BMI 29.83 kg/m2          Physical Exam   Constitutional: He is oriented to person, place, and time. He appears well-developed and well-nourished. No distress. Cardiovascular: Normal rate, regular rhythm, normal heart sounds and intact distal pulses. Exam reveals no gallop and no friction rub. No murmur heard.   Pulmonary/Chest: Effort normal and breath sounds normal. Abdominal: Normal appearance. There is no tenderness. Soft but full   Genitourinary:   Genitourinary Comments: Deferred on patient request   Musculoskeletal: He exhibits no edema. Neurological: He is alert and oriented to person, place, and time. Skin: Skin is warm and dry. Pertinent Labs/Studies:  Lab Results   Component Value Date/Time    WBC 7.1 02/13/2018 08:50 AM    HGB 14.0 02/13/2018 08:50 AM    HCT 40.9 02/13/2018 08:50 AM    PLATELET 588 89/32/1648 08:50 AM    MCV 92 02/13/2018 08:50 AM       Assessment and orders:       ICD-10-CM ICD-9-CM    1. Gastroesophageal reflux disease, esophagitis presence not specified K21.9 530.81 pantoprazole (PROTONIX) 40 mg tablet      REFERRAL TO GASTROENTEROLOGY      CANCELED: REFERRAL TO GASTROENTEROLOGY   2. Chronic idiopathic constipation K59.04 564.00 REFERRAL TO GASTROENTEROLOGY      polyethylene glycol (MIRALAX) 17 gram packet      docusate sodium 100 mg tab   3. Anal fissure K60.2 565.0 REFERRAL TO GASTROENTEROLOGY      polyethylene glycol (MIRALAX) 17 gram packet      docusate sodium 100 mg tab   4. Bleeding per rectum K62.5 569.3 CBC W/O DIFF     Diagnoses and all orders for this visit:    1. Gastroesophageal reflux disease, esophagitis presence not specified: History of GERD, recurrent symptoms, not taking medications at this time. Restart PPI and referral to GI.  -     pantoprazole (PROTONIX) 40 mg tablet; Take 1 Tab by mouth daily.  -     [place] [facility]    2. Chronic idiopathic constipation/Anal fissure/Bleeding per rectum: Patient with history of anal fissure, Chronic bleeding with BM and normal Hgb in the past.  Will restart bowel regimen at this time and urgen referral to GI. Patient deferred Rectal exam.  Will get CBC to evaluate for acute anemia, but no other alarm symptoms at this time. Strict ER precautions discussed. Jes[facility]s Zamora [facility]  -     polyethylene glycol (MIRALAX) 17 gram packet; Take 1 Packet by mouth daily.   -     docusate sodium 100 mg tab; Take 100 mg by mouth daily.  -     CBC W/O DIFF      Follow-up Disposition:  Return in about 2 weeks (around 8/1/2018) for Constipation/GERD/Anal fissure. I have discussed the diagnosis with the patient and the intended plan as seen in the above orders. Social history, medical history, and labs were reviewed. The patient has received an after-visit summary and questions were answered concerning future plans. I have discussed medication side effects and warnings with the patient as well.     Linsey Jiménez MD  Resident [facility]  07/18/18    Patient discussed with Dr. Bryan Dean, Attending Physician

## 2018-07-18 NOTE — PROGRESS NOTES
Chief Complaint   Patient presents with    Heartburn     1. Have you been to the ER, urgent care clinic since your last visit? Hospitalized since your last visit? No    2. Have you seen or consulted any other health care providers outside of the [address] [place] [address] since your last visit? Include any pap smears or colon screening.  No

## 2018-07-18 NOTE — PATIENT INSTRUCTIONS
Anal Fissure: Care Instructions  Your Care Instructions  An anal fissure is a tear in the lining of the lower rectum (anus). It can itch and cause pain. You may notice bright red blood on toilet paper after you wipe. A fissure may form if you are constipated and try to pass a large, hard stool or if you do not relax your anal muscles during a bowel movement. Most anal fissures heal with home treatment after a few days or weeks. If you have an anal fissure that takes more time to heal, your doctor may prescribe medicine. In rare cases, surgery may be needed. Anal fissures do not lead to colon cancer or other serious illnesses. However, if you have blood mixed in with the stool, talk to your doctor. Follow-up care is a key part of your treatment and safety. Be sure to make and go to all appointments, and call your doctor if you are having problems. It's also a good idea to know your test results and keep a list of the medicines you take. How can you care for yourself at home? · If your doctor prescribed cream or ointment, use it exactly as prescribed. Call your doctor if you think you are having a problem with your medicine. You will get more details on the specific medicines your doctor prescribes. · Sit in a few inches of warm water (sitz bath) 3 times a day and after bowel movements. The warm water helps the area heal and eases discomfort. Do not put soaps, salts, or shampoos in the water. · Avoid constipation:  ¨ Include fruits, vegetables, beans, and whole grains in your diet each day. These foods are high in fiber. ¨ Drink plenty of fluids, enough so that your urine is light yellow or clear like water. If you have kidney, heart, or liver disease and have to limit fluids, talk with your doctor before you increase the amount of fluids you drink. ¨ Get some exercise every day. Build up slowly to 30 to 60 minutes a day on 5 or more days of the week.   ¨ Take a fiber supplement, such as Benefiber, Citrucel, or Metamucil, every day if needed. Read and follow all instructions on the label. ¨ Use the toilet when you feel the urge. Or when you can, schedule time each day for a bowel movement. A daily routine may help. Take your time and do not strain when having a bowel movement. But do not sit on the toilet too long. · Support your feet with a small step stool when you sit on the toilet. This helps flex your hips and places your pelvis in a squatting position. · Your doctor may recommend an over-the-counter laxative, such as Miralax, Milk of Magnesia, or Ex-Lax. Read and follow all instructions on the label, and do not use these medicines on a long-term basis. · Do not use over-the-counter ointments or creams without talking to your doctor. Some of these preparations may not help. · Use baby wipes or medicated pads, such as Preparation H or Tucks, instead of toilet paper to clean after a bowel movement. These products do not irritate the anus. · Be safe with medicines. Read and follow all instructions on the label. ¨ If the doctor gave you a prescriptionmedicine for pain, take it as prescribed. ¨ If you are not taking a prescription pain medicine, ask your doctorif you can take an over-the-counter medicine. When should you call for help? Call your doctor now or seek immediate medical care if:    · You have new or worse pain.     · You have new or worse bleeding from the rectum.    Watch closely for changes in your health, and be sure to contact your doctor if:    · You have trouble passing stools.     · You do not get better as expected. Where can you learn more? Go to http://mary-clara.info/. Enter K952 in the search box to learn more about \"Anal Fissure: Care Instructions. \"  Current as of: May 12, 2017  Content Version: 11.7  © 7409-6253 [employer].  Care instructions adapted under license by [employer] (which disclaims liability or warranty for this information). If you have questions about a medical condition or this instruction, always ask your healthcare professional. [place] [address] any warranty or liability for your use of this information.

## 2018-07-18 NOTE — MR AVS SNAPSHOT
[address] 
[phone] Patient: Abdulaziz Thurman MRN: [id number] MALINI:[birth year] Visit Information Date & Time Provider Department Dept. Phone Encounter #  
 7/18/2018  9:45 AM Linsey Jiménez MD [address] [place] [address] [phone] [id number] Follow-up Instructions Return in about 2 weeks (around 8/1/2018) for Constipation/GERD/Anal fissure. Upcoming Health Maintenance Date Due Influenza Age 5 to Adult 8/1/2018 DTaP/Tdap/Td series (2 - Td) 4/4/2026 Allergies as of 7/18/2018  Review Complete On: 7/18/2018 By: Linsey Jiménez MD  
 No Known Allergies Current Immunizations  Reviewed on 2/6/2017 Name Date Influenza Nasal Vaccine 10/27/2015 Influenza Vaccine (Quad) PF 2/6/2017 Pneumococcal Polysaccharide (PPSV-23) 2/20/2017 Not reviewed this visit You Were Diagnosed With   
  
 Codes Comments Gastroesophageal reflux disease, esophagitis presence not specified    -  Primary ICD-10-CM: K21.9 ICD-9-CM: 530.81 Chronic idiopathic constipation     ICD-10-CM: K59.04 
ICD-9-CM: 564.00 Anal fissure     ICD-10-CM: K60.2 ICD-9-CM: 565.0 Bleeding per rectum     ICD-10-CM: K62.5 ICD-9-CM: 569.3 Vitals BP Pulse Temp Resp Height(growth percentile) Weight(growth percentile) (!) 122/91 70 97 °F (36.1 °C) (Oral) 16 5' 9\" (1.753 m) 202 lb (91.6 kg) SpO2 BMI Smoking Status 97% 29.83 kg/m2 Former Smoker Vitals History BMI and BSA Data Body Mass Index Body Surface Area  
 29.83 kg/m 2 2.11 m 2 Preferred Pharmacy Pharmacy Name Phone [facility] [address] [phone] Your Updated Medication List  
  
   
This list is accurate as of 7/18/18 10:43 AM.  Always use your most recent med list.  
  
  
  
  
 atorvastatin 80 mg tablet Commonly known as:  LIPITOR Take 1 Tab by mouth daily. Indications: mixed hyperlipidemia  
  
 cyclobenzaprine 5 mg tablet Commonly known as:  FLEXERIL Take 1 Tab by mouth nightly as needed for Muscle Spasm(s). docusate sodium 100 mg Tab Take 100 mg by mouth daily. hydrOXYzine pamoate 25 mg capsule Commonly known as:  VISTARIL  
TAKE 1 CAPSULE BY MOUTH THREE TIMES DAILY AS NEEDED FOR ANXIETY  
  
 ibuprofen 800 mg tablet Commonly known as:  MOTRIN Take 1 Tab by mouth every eight (8) hours as needed for Pain.  
  
 pantoprazole 40 mg tablet Commonly known as:  PROTONIX Take 1 Tab by mouth daily. polyethylene glycol 17 gram packet Commonly known as:  Sheria Bud Take 1 Packet by mouth daily. sertraline 25 mg tablet Commonly known as:  ZOLOFT Take 1 Tab by mouth daily. tadalafil 5 mg tablet Commonly known as:  CIALIS Take 3 Tabs by mouth daily as needed. tamsulosin 0.4 mg capsule Commonly known as:  FLOMAX Take 2 Caps by mouth daily. Prescriptions Sent to Pharmacy Refills  
 pantoprazole (PROTONIX) 40 mg tablet 3 Sig: Take 1 Tab by mouth daily. Class: Normal  
 Pharmacy: [address] Ph #: [phone] Route: Oral  
 polyethylene glycol (MIRALAX) 17 gram packet 2 Sig: Take 1 Packet by mouth daily. Class: Normal  
 Pharmacy: [address] Ph #: [phone] Route: Oral  
 docusate sodium 100 mg tab 3 Sig: Take 100 mg by mouth daily. Class: Normal  
 Pharmacy: [address] Ph #: [phone] Route: Oral  
  
We Performed the Following CBC W/O DIFF [93111 CPT(R)] REFERRAL TO GASTROENTEROLOGY [XHL39 Custom] Follow-up Instructions Return in about 2 weeks (around 8/1/2018) for Constipation/GERD/Anal fissure. Referral Information Referral ID Referred By Referred To  
  
 [id number] [facility] [address] [facility],[address], [place] [address] Phone: [phone] Fax: [phone] Visits Status Start Date End Date 1 New Request 7/18/18 7/18/19 If your referral has a status of pending review or denied, additional information will be sent to support the outcome of this decision. Patient Instructions Anal Fissure: Care Instructions Your Care Instructions An anal fissure is a tear in the lining of the lower rectum (anus). It can itch and cause pain. You may notice bright red blood on toilet paper after you wipe. A fissure may form if you are constipated and try to pass a large, hard stool or if you do not relax your anal muscles during a bowel movement. Most anal fissures heal with home treatment after a few days or weeks. If you have an anal fissure that takes more time to heal, your doctor may prescribe medicine. In rare cases, surgery may be needed. Anal fissures do not lead to colon cancer or other serious illnesses. However, if you have blood mixed in with the stool, talk to your doctor. Follow-up care is a key part of your treatment and safety. Be sure to make and go to all appointments, and call your doctor if you are having problems. It's also a good idea to know your test results and keep a list of the medicines you take. How can you care for yourself at home? · If your doctor prescribed cream or ointment, use it exactly as prescribed. Call your doctor if you think you are having a problem with your medicine. You will get more details on the specific medicines your doctor prescribes. · Sit in a few inches of warm water (sitz bath) 3 times a day and after bowel movements. The warm water helps the area heal and eases discomfort. Do not put soaps, salts, or shampoos in the water. · Avoid constipation: ¨ Include fruits, vegetables, beans, and whole grains in your diet each day. These foods are high in fiber. ¨ Drink plenty of fluids, enough so that your urine is light yellow or clear like water. If you have kidney, heart, or liver disease and have to limit fluids, talk with your doctor before you increase the amount of fluids you drink. ¨ Get some exercise every day. Build up slowly to 30 to 60 minutes a day on 5 or more days of the week. ¨ Take a fiber supplement, such as Benefiber, Citrucel, or Metamucil, every day if needed. Read and follow all instructions on the label. ¨ Use the toilet when you feel the urge. Or when you can, schedule time each day for a bowel movement. A daily routine may help. Take your time and do not strain when having a bowel movement. But do not sit on the toilet too long. · Support your feet with a small step stool when you sit on the toilet. This helps flex your hips and places your pelvis in a squatting position. · Your doctor may recommend an over-the-counter laxative, such as Miralax, Milk of Magnesia, or Ex-Lax. Read and follow all instructions on the label, and do not use these medicines on a long-term basis. · Do not use over-the-counter ointments or creams without talking to your doctor. Some of these preparations may not help. · Use baby wipes or medicated pads, such as Preparation H or Tucks, instead of toilet paper to clean after a bowel movement. These products do not irritate the anus. · Be safe with medicines. Read and follow all instructions on the label. ¨ If the doctor gave you a prescriptionmedicine for pain, take it as prescribed. ¨ If you are not taking a prescription pain medicine, ask your doctorif you can take an over-the-counter medicine. When should you call for help? Call your doctor now or seek immediate medical care if: 
  · You have new or worse pain.  
  · You have new or worse bleeding from the rectum.  Watch closely for changes in your health, and be sure to contact your doctor if: 
  · You have trouble passing stools.  
  · You do not get better as expected. Where can you learn more? Go to http://mary-clara.info/. Enter H040 in the search box to learn more about \"Anal Fissure: Care Instructions. \" Current as of: May 12, 2017 Content Version: 11.7 © 8438-4237 [employer]. Care instructions adapted under license by [employer] (which disclaims liability or warranty for this information). If you have questions about a medical condition or this instruction, always ask your healthcare professional. [address] any warranty or liability for your use of this information. Introducing [facility]! [employer] introduces [employer] patient portal. Now you can access parts of your medical record, email your doctor's office, and request medication refills online. 1. In your internet browser, go to [email or web address] 2. Click on the First Time User? Click Here link in the Sign In box. You will see the New Member Sign Up page. 3. Enter your [employer] Access Code exactly as it appears below. You will not need to use this code after youve completed the sign-up process. If you do not sign up before the expiration date, you must request a new code. · [employer] Access Code: [email or web address] Expires: 10/16/2018 10:42 AM 
 
4. Enter the last four digits of your Social Security Number (xxxx) and Date of Birth (mm/dd/yyyy) as indicated and click Submit. You will be taken to the next sign-up page. 5. Create a [employer] ID. This will be your [employer] login ID and cannot be changed, so think of one that is secure and easy to remember. 6. Create a [employer] password. You can change your password at any time. 7. Enter your Password Reset Question and Answer. This can be used at a later time if you forget your password. 8. Enter your e-mail address. You will receive e-mail notification when new information is available in [address] 9. Click Sign Up. You can now view and download portions of your medical record. 10. Click the Download Summary menu link to download a portable copy of your medical information. If you have questions, please visit the Frequently Asked Questions section of the [employer] website. Remember, [employer] is NOT to be used for urgent needs. For medical emergencies, dial 911. Now available from your iPhone and Android! Please provide this summary of care documentation to your next provider. Your primary care clinician is listed as Selvin Tang. If you have any questions after today's visit, please call [phone].

## 2018-07-19 LAB
ERYTHROCYTE [DISTWIDTH] IN BLOOD BY AUTOMATED COUNT: 13.9 % (ref 12.3–15.4)
HCT VFR BLD AUTO: 41.3 % (ref 37.5–51)
HGB BLD-MCNC: 13.9 G/DL (ref 13–17.7)
MCH RBC QN AUTO: 30.6 PG (ref 26.6–33)
MCHC RBC AUTO-ENTMCNC: 33.7 G/DL (ref 31.5–35.7)
MCV RBC AUTO: 91 FL (ref 79–97)
PLATELET # BLD AUTO: 260 X10E3/UL (ref 150–379)
RBC # BLD AUTO: 4.54 X10E6/UL (ref 4.14–5.8)
WBC # BLD AUTO: 8.3 X10E3/UL (ref 3.4–10.8)

## 2018-08-29 PROBLEM — Z98.890 HX OF COLONOSCOPY WITH POLYPECTOMY: Status: ACTIVE | Noted: 2018-08-29

## 2018-08-29 PROBLEM — Z86.010 HX OF COLONOSCOPY WITH POLYPECTOMY: Status: ACTIVE | Noted: 2018-08-29

## 2019-03-11 ENCOUNTER — OFFICE VISIT (OUTPATIENT)
Dept: FAMILY MEDICINE CLINIC | Age: 49
End: 2019-03-11

## 2019-03-11 VITALS
RESPIRATION RATE: 18 BRPM | BODY MASS INDEX: 29.3 KG/M2 | WEIGHT: 197.8 LBS | DIASTOLIC BLOOD PRESSURE: 78 MMHG | SYSTOLIC BLOOD PRESSURE: 129 MMHG | TEMPERATURE: 98.9 F | OXYGEN SATURATION: 98 % | HEART RATE: 69 BPM | HEIGHT: 69 IN

## 2019-03-11 DIAGNOSIS — E78.49 OTHER HYPERLIPIDEMIA: ICD-10-CM

## 2019-03-11 DIAGNOSIS — Z00.00 ENCOUNTER FOR WELLNESS EXAMINATION IN ADULT: Primary | ICD-10-CM

## 2019-03-11 DIAGNOSIS — Z23 ENCOUNTER FOR IMMUNIZATION: ICD-10-CM

## 2019-03-11 DIAGNOSIS — R45.89 DEPRESSED MOOD: ICD-10-CM

## 2019-03-11 DIAGNOSIS — F32.1 MAJOR DEPRESSIVE DISORDER, SINGLE EPISODE, MODERATE (HCC): ICD-10-CM

## 2019-03-11 RX ORDER — BUPROPION HYDROCHLORIDE 150 MG/1
150 TABLET ORAL
Qty: 30 TAB | Refills: 1 | Status: SHIPPED | OUTPATIENT
Start: 2019-03-11 | End: 2019-05-06 | Stop reason: SDUPTHER

## 2019-03-11 NOTE — PROGRESS NOTES
Nhi Anderson is a 50 y.o. male  Chief Complaint   Patient presents with    Complete Physical    Sleep Problem     patient states he has some difficulty staying asleep throughout the night, patient states he has been taking a Zquil OTC to help with sleep, was previously prescribed Trazodone and Ambien      Visit Vitals  BP (!) 150/96 (BP 1 Location: Left arm, BP Patient Position: Sitting)   Pulse 69   Temp 98.9 °F (37.2 °C) (Oral)   Resp 18   Ht 5' 9\" (1.753 m)   Wt 197 lb 12.8 oz (89.7 kg)   SpO2 98%   BMI 29.21 kg/m²     1. Have you been to the ER, urgent care clinic since your last visit? Hospitalized since your last visit? No    2. Have you seen or consulted any other health care providers outside of the [address] [place] [address] since your last visit? Include any pap smears or colon screening.  No  Health Maintenance Due   Topic Date Due    Influenza Age 5 to Adult  08/01/2018

## 2019-03-11 NOTE — PROGRESS NOTES
Subjective:   Sarbjit Hernandez is an 50 y.o. male who presents for complete physical exam.    Diet: Still eating a lot of fast food. Knows he heeds to change his diet. Exercise: Not exercising. Interested in [employer]. Psych: Currently taking electrical classes. Having some issues sleeping over the past few months. Using OTC meds. Goes to sleep but doesn't stay asleep. Has daytime sleepiness. Mood is the same. Has depressed mood. Still enjoys spending time with his son. No SI or HI. Previously taking Zoloft but ran out of medication. He has a lot of incomplete tasks at work and procrastinates a lot. Son has ADHD. Health Maintenance reviewed -  Colonoscopy (age 54-65): 2018 with 5 year follow up    AAA Screening (age 73-68 if ever smoked): Not indicated    Lung Cancer Screening (age 46-80 w. 30pk/yr hx and currently smoking, or quit < 15 yrs ago): Not indicated    HIV testing: Desired    Hepatitis C testing (born between 46-77): Not indicated    Allergies - reviewed:   No Known Allergies      Medications - reviewed:  Current Outpatient Medications   Medication Sig    plecanatide (TRULANCE) 3 mg tab Take  by mouth.  buPROPion XL (WELLBUTRIN XL) 150 mg tablet Take 1 Tab by mouth every morning.  tamsulosin (FLOMAX) 0.4 mg capsule Take 2 Caps by mouth daily.  pantoprazole (PROTONIX) 40 mg tablet Take 1 Tab by mouth daily.  polyethylene glycol (MIRALAX) 17 gram packet Take 1 Packet by mouth daily.  docusate sodium 100 mg tab Take 100 mg by mouth daily.  atorvastatin (LIPITOR) 80 mg tablet Take 1 Tab by mouth daily. Indications: mixed hyperlipidemia     No current facility-administered medications for this visit.           Past Medical History - reviewed:  Past Medical History:   Diagnosis Date    DJD (degenerative joint disease) of cervical spine          Past Surgical History - reviewed:  Past Surgical History:   Procedure Laterality Date    HX ORTHOPAEDIC  1994    ankle surgery Family History - reviewed:  Family History   Problem Relation Age of Onset    Diabetes Mother     Hypertension Mother     Diabetes Father     Hypertension Father          Social History - reviewed:  Social History     Socioeconomic History    Marital status:      Spouse name: Not on file    Number of children: Not on file    Years of education: Not on file    Highest education level: Not on file   Social Needs    Financial resource strain: Not on file    Food insecurity - worry: Not on file    Food insecurity - inability: Not on file    Transportation needs - medical: Not on file   [employer] needs - non-medical: Not on file   Occupational History    Not on file   Tobacco Use    Smoking status: Former Smoker     Last attempt to quit: 2000     Years since quittin.3    Smokeless tobacco: Never Used   Substance and Sexual Activity    Alcohol use: Yes     Comment: socially    Drug use: No    Sexual activity: Yes   Other Topics Concern    Not on file   Social History Narrative    Not on file         Immunizations - reviewed:   Immunization History   Administered Date(s) Administered    Influenza Nasal Vaccine 10/27/2015    Influenza Vaccine (Quad) PF 2017, 2019    Pneumococcal Polysaccharide (PPSV-23) 2017       Review of systems:  Items bolded if positive. Constitutional: Fever, chills, night sweats, weight loss, lymphadenopathy, fatigue  HEENT: Vision change, eye pain, rhinorrhea, sinus pain, epistaxis, dysphagia, change in hearing, tinnitus, vertigo.    Endocrine: Weight change, heat/ cold intolerance, tremor, insomnia, polyuria, polydipsia, polyphagia, abnl hair growth, nail changes  Cardiovascular: Chest pain, palpitations, syncope, lower extremity edema, orthopnea, paroxysmal nocturnal dyspnea  Pulmonary: Shortness of breath, dyspnea on exertion, cough, hemoptysis, wheezing  GI: Nausea, vomiting, diarrhea, melena, hematochezia, change in appetite, abdominal pain, change in bowel habits or stools  : Dysuria, frequency, urgency, incontinence, hematuria, nocturia  Musculoskeletal: joint swelling or pain, muscle pain, back pain  Skin:  Rash, New/growing/changing skin lesions  Neurologic: Headache, muscle weakness, paresthesias, anesthesia, ataxia, change in speech, change in gait   Psychiatric: depression, anxiety, hallucinations, trevon, SI/HI        Objective:     Visit Vitals  /78 (BP 1 Location: Right arm, BP Patient Position: Sitting)   Pulse 69   Temp 98.9 °F (37.2 °C) (Oral)   Resp 18   Ht 5' 9\" (1.753 m)   Wt 197 lb 12.8 oz (89.7 kg)   SpO2 98%   BMI 29.21 kg/m²       General appearance - alert, well appearing, and in no distress  Eyes - pupils equal and reactive, extraocular eye movements intact  Ears - Cloudy fluid behind left TM. Nose - normal and patent, no erythema, discharge or polyps  Mouth - mucous membranes moist, pharynx normal without lesions  Neck - supple, no significant adenopathy  Chest - clear to auscultation, no wheezes, rales or rhonchi, symmetric air entry  Heart - normal rate, regular rhythm, normal S1, S2, no murmurs, rubs, clicks or gallops  Abdomen - soft, nontender, nondistended, no masses or organomegaly  Neurological - alert, oriented, normal speech, no focal findings or movement disorder noted  Musculoskeletal - no joint tenderness, deformity or swelling  Extremities - peripheral pulses normal, no pedal edema, no clubbing or cyanosis  Skin - normal coloration and turgor, no rashes, no suspicious skin lesions noted      Assessment:       ICD-10-CM ICD-9-CM    1. Encounter for wellness examination in adult Z00.00 V70.0 HIV 1/2 AG/AB, 4TH GENERATION,W RFLX CONFIRM   2. Other hyperlipidemia M98.67 101.4 METABOLIC PANEL, BASIC      LIPID PANEL      LDL, DIRECT   3. Depressed mood F32.9 311    4. Major depressive disorder, single episode, moderate (HCC) F32.1 296.22 buPROPion XL (WELLBUTRIN XL) 150 mg tablet   5. Encounter for immunization Z23 V03.89 INFLUENZA VIRUS VAC QUAD,SPLIT,PRESV FREE SYRINGE IM           Plan:   · Counseled re: diet, exercise, healthy lifestyle    · Appropriate labs, vaccines, imaging studies, and referrals ordered as listed above    · Discussed the patient's BMI with him. The BMI follow up plan is as follows: I have counseled this patient on diet and exercise regimens. Referred to [facility] PREP program.    · Trial Wellbutrin for depression and ? ADHD    · Influenza vaccine today    · Follow up in 4 weeks for med eval    Follow-up Disposition:  Return in about 4 weeks (around 4/8/2019) for Medication follow up. I have discussed the diagnosis with the patient and the intended plan as seen in the above orders. The patient has received an after-visit summary and questions were answered concerning future plans. I have discussed medication side effects and warnings with the patient as well. Informed pt to return to the office if new symptoms arise.       Zaynab Boswell MD

## 2019-03-11 NOTE — PATIENT INSTRUCTIONS

## 2019-03-12 ENCOUNTER — TELEPHONE (OUTPATIENT)
Dept: FAMILY MEDICINE CLINIC | Age: 49
End: 2019-03-12

## 2019-03-12 LAB
BUN SERPL-MCNC: 15 MG/DL (ref 6–24)
BUN/CREAT SERPL: 12 (ref 9–20)
CALCIUM SERPL-MCNC: 9.4 MG/DL (ref 8.7–10.2)
CHLORIDE SERPL-SCNC: 107 MMOL/L (ref 96–106)
CHOLEST SERPL-MCNC: 263 MG/DL (ref 100–199)
CO2 SERPL-SCNC: 21 MMOL/L (ref 20–29)
COMMENT, 011824: ABNORMAL
CREAT SERPL-MCNC: 1.21 MG/DL (ref 0.76–1.27)
GLUCOSE SERPL-MCNC: 93 MG/DL (ref 65–99)
HDLC SERPL-MCNC: 58 MG/DL
HIV 1+2 AB+HIV1 P24 AG SERPL QL IA: NON REACTIVE
INTERPRETATION, 910389: NORMAL
LDLC SERPL CALC-MCNC: 182 MG/DL (ref 0–99)
LDLC SERPL DIRECT ASSAY-MCNC: 192 MG/DL (ref 0–99)
POTASSIUM SERPL-SCNC: 4.6 MMOL/L (ref 3.5–5.2)
SODIUM SERPL-SCNC: 143 MMOL/L (ref 134–144)
TRIGL SERPL-MCNC: 114 MG/DL (ref 0–149)
VLDLC SERPL CALC-MCNC: 23 MG/DL (ref 5–40)

## 2019-03-12 NOTE — PROGRESS NOTES
Lipids significantly improved  10 yr ASCVD risk 3.7%  HIV negative  BMP normal    Please call and notify patient the following:  - Your cholesterol is significantly improved!!  - Your overall 10 year risk of heart disease is low and there is no need to restart the cholesterol medication at this time. We will keep checking your cholesterol yearly to be sure. I would recommend working on diet and exercise as we discussed to keep this risk low and to get your cholesterol even better.    - Your HIV testing was negative  - Kidney function and electrolytes are normal

## 2019-03-12 NOTE — TELEPHONE ENCOUNTER
----- Message from Allyson Epps MD sent at 3/12/2019  9:23 AM EDT -----  Lipids significantly improved  10 yr ASCVD risk 3.7%  HIV negative  BMP normal    Please call and notify patient the following:  - Your cholesterol is significantly improved!!  - Your overall 10 year risk of heart disease is low and there is no need to restart the cholesterol medication at this time. We will keep checking your cholesterol yearly to be sure. I would recommend working on diet and exercise as we discussed to keep this risk low and to get your cholesterol even better.    - Your HIV testing was negative  - Kidney function and electrolytes are normal

## 2019-03-12 NOTE — TELEPHONE ENCOUNTER
Called and spoke with patient, two identifiers verified. Notified patient of lab results per Dr. Feliberto Watkins. Patient verbalized understanding.

## 2019-04-23 ENCOUNTER — OFFICE VISIT (OUTPATIENT)
Dept: FAMILY MEDICINE CLINIC | Age: 49
End: 2019-04-23

## 2019-04-23 VITALS
HEIGHT: 69 IN | RESPIRATION RATE: 16 BRPM | TEMPERATURE: 97.9 F | WEIGHT: 203 LBS | OXYGEN SATURATION: 96 % | BODY MASS INDEX: 30.07 KG/M2 | SYSTOLIC BLOOD PRESSURE: 110 MMHG | DIASTOLIC BLOOD PRESSURE: 82 MMHG | HEART RATE: 74 BPM

## 2019-04-23 DIAGNOSIS — M54.50 ACUTE LEFT-SIDED LOW BACK PAIN WITHOUT SCIATICA: Primary | ICD-10-CM

## 2019-04-23 LAB
BILIRUB UR QL STRIP: NEGATIVE
GLUCOSE UR-MCNC: NEGATIVE MG/DL
KETONES P FAST UR STRIP-MCNC: NEGATIVE MG/DL
PH UR STRIP: 6 [PH] (ref 4.6–8)
PROT UR QL STRIP: NEGATIVE
SP GR UR STRIP: 1 (ref 1–1.03)
UA UROBILINOGEN AMB POC: NORMAL (ref 0.2–1)
URINALYSIS CLARITY POC: CLEAR
URINALYSIS COLOR POC: NORMAL
URINE BLOOD POC: NEGATIVE
URINE LEUKOCYTES POC: NEGATIVE
URINE NITRITES POC: NEGATIVE

## 2019-04-23 NOTE — PROGRESS NOTES
[address] [place] [address]   Office [phone], Fax [phone]      Subjective:     CC:Left sided back pain   History provided by patient     HPI:  Abdulaziz Thurman is a 50 y.o. OTHER male  of  presenting for left sided back pain x 1 week. Left sided back pain: Onset 1 week. Location: Left side of the back  Pain is constant pain. 5/10. Patient has been using ice and with minimal relief. Denies accident, trauma or heavy lifiting. Patient reports history of BPH on flomax. Denies hematuria or dysuria. Denies radicular type of pain. Patient with history of constipation and has seen GI yesterday. Patient  and no heavy lifting involved with his work. Review of Systems   Constitutional: Negative for chills and fever. Respiratory: Negative for shortness of breath. Cardiovascular: Negative for chest pain. Gastrointestinal: Negative for abdominal pain, nausea and vomiting. Genitourinary: Positive for flank pain. Negative for dysuria, frequency, hematuria and urgency. Past Medical History:   Diagnosis Date    DJD (degenerative joint disease) of cervical spine        Current Outpatient Medications on File Prior to Visit   Medication Sig Dispense Refill    buPROPion XL (WELLBUTRIN XL) 150 mg tablet Take 1 Tab by mouth every morning. 30 Tab 1    polyethylene glycol (MIRALAX) 17 gram packet Take 1 Packet by mouth daily. 30 Packet 2    tamsulosin (FLOMAX) 0.4 mg capsule Take 2 Caps by mouth daily. 180 Cap 0    plecanatide (TRULANCE) 3 mg tab Take  by mouth.  pantoprazole (PROTONIX) 40 mg tablet Take 1 Tab by mouth daily. 30 Tab 3    docusate sodium 100 mg tab Take 100 mg by mouth daily. 30 Tab 3    atorvastatin (LIPITOR) 80 mg tablet Take 1 Tab by mouth daily. Indications: mixed hyperlipidemia 30 Tab 3     No current facility-administered medications on file prior to visit.         No Known Allergies    Past Surgical History:   Procedure Laterality Date    HX ORTHOPAEDIC  1994    ankle surgery       Social History     Socioeconomic History    Marital status:      Spouse name: Not on file    Number of children: Not on file    Years of education: Not on file    Highest education level: Not on file   Occupational History    Not on file   Social Needs    Financial resource strain: Not on file    Food insecurity:     Worry: Not on file     Inability: Not on file    Transportation needs:     Medical: Not on file     Non-medical: Not on file   Tobacco Use    Smoking status: Former Smoker     Last attempt to quit: 2000     Years since quittin.4    Smokeless tobacco: Never Used   Substance and Sexual Activity    Alcohol use: Yes     Comment: socially    Drug use: No    Sexual activity: Yes   Lifestyle    Physical activity:     Days per week: Not on file     Minutes per session: Not on file    Stress: Not on file   Relationships    Social connections:     Talks on phone: Not on file     Gets together: Not on file     Attends Hinduism service: Not on file     Active member of club or organization: Not on file     Attends meetings of clubs or organizations: Not on file     Relationship status: Not on file    Intimate partner violence:     Fear of current or ex partner: Not on file     Emotionally abused: Not on file     Physically abused: Not on file     Forced sexual activity: Not on file   Other Topics Concern    Not on file   Social History Narrative    Not on file       Patient Active Problem List   Diagnosis Code    HLD (hyperlipidemia) E78.5    Chronic lower back pain M54.5, G89.29    Degenerative disc disease at L5-S1 level M51.36    Major depressive disorder, single episode, moderate ([facility] [address] F32.1    Anxiety F41.9    Hx of colonoscopy with polypectomy Z98.890, Z86.010         Objective:   Vitals - reviewed  Visit Vitals  /82   Pulse 74   Temp 97.9 °F (36.6 °C) (Oral)   Resp 16   Ht 5' 9\" (1.753 m)   Wt 203 lb (92.1 kg)   SpO2 96%   BMI 29.98 kg/m²        Physical Exam   Constitutional: He appears well-developed and well-nourished. HENT:   Head: Normocephalic and atraumatic. Cardiovascular: Normal rate and regular rhythm. Pulmonary/Chest: Effort normal.   Abdominal: Soft. There is no tenderness. No CVA tenderness   Musculoskeletal: Normal range of motion. Mild paraspinal tenderness in the left lumbar region             Assessment and orders:       ICD-10-CM ICD-9-CM    1. Acute left-sided low back pain without sciatica M54.5 724.2 AMB POC URINALYSIS DIP STICK AUTO W/O MICRO     Diagnoses and all orders for this visit:    1. Acute left-sided low back pain without sciatica: No red flags. No urinary or bowel incontinence. No gait disturbance. Patient with full ROM. Pain up on forward flexion of the back and relief of pain on extension. Mild paraspinal tenderness in left lower lumbar area. UA: unremarkable  No CVA tenderness. UA unremarkable. -     AMB POC URINALYSIS DIP STICK AUTO W/O MICRO  -     Apply heating pad in the area  -      Aleve/Ibuprofen for pain   -      If symptoms persist, will refer to physical therapy. Pt was discussed with Dr. Melvin Herman (attending physician). I have reviewed patient medical and social history and medications. I have reviewed pertinent labs results and other data. I have discussed the diagnosis with the patient and the intended plan as seen in the above orders. The patient has received an after-visit summary and questions were answered concerning future plans. I have discussed medication side effects and warnings with the patient as well.     Duyen Zavala MD  Resident [address] [place] [address]  04/23/19

## 2019-04-23 NOTE — PROGRESS NOTES
Chief Complaint   Patient presents with    Flank Pain     times one week (left side)     1. Have you been to the ER, urgent care clinic since your last visit? Hospitalized since your last visit? No    2. Have you seen or consulted any other health care providers outside of the [address] [place] [address] since your last visit? Include any pap smears or colon screening.  No

## 2019-04-23 NOTE — PATIENT INSTRUCTIONS
Back Pain: Care Instructions  Your Care Instructions    Back pain has many possible causes. It is often related to problems with muscles and ligaments of the back. It may also be related to problems with the nerves, discs, or bones of the back. Moving, lifting, standing, sitting, or sleeping in an awkward way can strain the back. Sometimes you don't notice the injury until later. Arthritis is another common cause of back pain. Although it may hurt a lot, back pain usually improves on its own within several weeks. Most people recover in 12 weeks or less. Using good home treatment and being careful not to stress your back can help you feel better sooner. Follow-up care is a key part of your treatment and safety. Be sure to make and go to all appointments, and call your doctor if you are having problems. It's also a good idea to know your test results and keep a list of the medicines you take. How can you care for yourself at home? · Sit or lie in positions that are most comfortable and reduce your pain. Try one of these positions when you lie down:  ? Lie on your back with your knees bent and supported by large pillows. ? Lie on the floor with your legs on the seat of a sofa or chair. ? Lie on your side with your knees and hips bent and a pillow between your legs. ? Lie on your stomach if it does not make pain worse. · Do not sit up in bed, and avoid soft couches and twisted positions. Bed rest can help relieve pain at first, but it delays healing. Avoid bed rest after the first day of back pain. · Change positions every 30 minutes. If you must sit for long periods of time, take breaks from sitting. Get up and walk around, or lie in a comfortable position. · Try using a heating pad on a low or medium setting for 15 to 20 minutes every 2 or 3 hours. Try a warm shower in place of one session with the heating pad. · You can also try an ice pack for 10 to 15 minutes every 2 to 3 hours.  Put a thin cloth between the ice pack and your skin. · Take pain medicines exactly as directed. ? If the doctor gave you a prescription medicine for pain, take it as prescribed. ? If you are not taking a prescription pain medicine, ask your doctor if you can take an over-the-counter medicine. · Take short walks several times a day. You can start with 5 to 10 minutes, 3 or 4 times a day, and work up to longer walks. Walk on level surfaces and avoid hills and stairs until your back is better. · Return to work and other activities as soon as you can. Continued rest without activity is usually not good for your back. · To prevent future back pain, do exercises to stretch and strengthen your back and stomach. Learn how to use good posture, safe lifting techniques, and proper body mechanics. When should you call for help? Call your doctor now or seek immediate medical care if:    · You have new or worsening numbness in your legs.     · You have new or worsening weakness in your legs. (This could make it hard to stand up.)     · You lose control of your bladder or bowels.    Watch closely for changes in your health, and be sure to contact your doctor if:    · You have a fever, lose weight, or don't feel well.     · You do not get better as expected. Where can you learn more? Go to http://mary-clara.info/. Enter N427 in the search box to learn more about \"Back Pain: Care Instructions. \"  Current as of: September 20, 2018  Content Version: 11.9  © 0384-0032 [employer]. Care instructions adapted under license by [employer] (which disclaims liability or warranty for this information). If you have questions about a medical condition or this instruction, always ask your healthcare professional. [place] [address] any warranty or liability for your use of this information.

## 2019-05-06 DIAGNOSIS — F32.1 MAJOR DEPRESSIVE DISORDER, SINGLE EPISODE, MODERATE (HCC): ICD-10-CM

## 2019-05-07 RX ORDER — BUPROPION HYDROCHLORIDE 150 MG/1
TABLET ORAL
Qty: 30 TAB | Refills: 1 | Status: SHIPPED | OUTPATIENT
Start: 2019-05-07 | End: 2019-05-22 | Stop reason: SDUPTHER

## 2019-05-22 ENCOUNTER — OFFICE VISIT (OUTPATIENT)
Dept: FAMILY MEDICINE CLINIC | Age: 49
End: 2019-05-22

## 2019-05-22 VITALS — BODY MASS INDEX: 28.44 KG/M2 | HEIGHT: 69 IN | WEIGHT: 192 LBS | RESPIRATION RATE: 16 BRPM

## 2019-05-22 DIAGNOSIS — F32.1 MAJOR DEPRESSIVE DISORDER, SINGLE EPISODE, MODERATE (HCC): ICD-10-CM

## 2019-05-22 DIAGNOSIS — F51.01 PRIMARY INSOMNIA: Primary | ICD-10-CM

## 2019-05-22 DIAGNOSIS — N52.9 ERECTILE DYSFUNCTION, UNSPECIFIED ERECTILE DYSFUNCTION TYPE: ICD-10-CM

## 2019-05-22 DIAGNOSIS — R35.1 NOCTURIA: ICD-10-CM

## 2019-05-22 RX ORDER — TAMSULOSIN HYDROCHLORIDE 0.4 MG/1
0.8 CAPSULE ORAL DAILY
Qty: 180 CAP | Refills: 1 | Status: SHIPPED | OUTPATIENT
Start: 2019-05-22 | End: 2022-03-15

## 2019-05-22 RX ORDER — BUPROPION HYDROCHLORIDE 300 MG/1
TABLET ORAL
Qty: 90 TAB | Refills: 1 | Status: SHIPPED | OUTPATIENT
Start: 2019-05-22 | End: 2020-10-19 | Stop reason: SDUPTHER

## 2019-05-22 RX ORDER — TRAZODONE HYDROCHLORIDE 50 MG/1
50 TABLET ORAL
Qty: 30 TAB | Refills: 1 | Status: SHIPPED | OUTPATIENT
Start: 2019-05-22 | End: 2019-08-12 | Stop reason: SDUPTHER

## 2019-05-22 RX ORDER — TADALAFIL 5 MG/1
15 TABLET ORAL
Qty: 30 TAB | Refills: 3 | Status: SHIPPED | OUTPATIENT
Start: 2019-05-22 | End: 2019-08-29

## 2019-05-22 NOTE — PROGRESS NOTES
Chief Complaint   Patient presents with    Medication Evaluation     1. Have you been to the ER, urgent care clinic since your last visit? Hospitalized since your last visit? No    2. Have you seen or consulted any other health care providers outside of the [address] [place] [address] since your last visit? Include any pap smears or colon screening. Yes When: April 2019; 5/16/19 Where: Dr Jose Adams; Dr Cammy Blankenship at [place] Reason for visit: Colonoscopy; Hemorrhoids  Visit Vitals  BP (P) 129/82 (BP 1 Location: Right arm, BP Patient Position: Sitting)   Pulse (P) 79   Temp (P) 98.4 °F (36.9 °C) (Oral)   Resp 16   Ht 5' 9\" (1.753 m)   Wt 192 lb (87.1 kg)   SpO2 (P) 95%   BMI 28.35 kg/m²     There are no preventive care reminders to display for this patient.       Takes 1 and half tab wellbutrin

## 2019-05-22 NOTE — PROGRESS NOTES
History of Present Illness:     Chief Complaint   Patient presents with    Medication Evaluation     Pt is a 50y.o. year old male    Presents to clinic for medication follow up. Started on Wellbutrin for ADHD and depression  Feels the medication is working well  Mood is better and concentration is better as well  Increased to Wellbutrin 1.5 tabs daily  Still having poor sleep    Denies SI/ HI     Diagnosed with REJI  Dose not use CPAP  C/o poor sleep; can fall asleep but but cannot stay asleep    Saw a urologist   Taking Flomax without significant difference  Last saw them 1 year ago  Still having weak urinary stream  Denies dysuria, hematuria  2-3 x nocturia    Asking to have a new script of Cialis  No new sexual partner, but wants to go out more    3 most recent PHQ Screens 5/22/2019   Little interest or pleasure in doing things Not at all   Feeling down, depressed, irritable, or hopeless Not at all   Total Score PHQ 2 0   Trouble falling or staying asleep, or sleeping too much -   Feeling tired or having little energy -   Poor appetite, weight loss, or overeating -   Feeling bad about yourself - or that you are a failure or have let yourself or your family down -   Trouble concentrating on things such as school, work, reading, or watching TV -   Moving or speaking so slowly that other people could have noticed; or the opposite being so fidgety that others notice -   Thoughts of being better off dead, or hurting yourself in some way -   PHQ 9 Score -   How difficult have these problems made it for you to do your work, take care of your home and get along with others -         Past Medical History:   Diagnosis Date    DJD (degenerative joint disease) of cervical spine          Current Outpatient Medications on File Prior to Visit   Medication Sig Dispense Refill    pantoprazole (PROTONIX) 40 mg tablet Take 1 Tab by mouth daily.  30 Tab 3    polyethylene glycol (MIRALAX) 17 gram packet Take 1 Packet by mouth daily. 30 Packet 2    plecanatide (TRULANCE) 3 mg tab Take  by mouth.  docusate sodium 100 mg tab Take 100 mg by mouth daily. 30 Tab 3    atorvastatin (LIPITOR) 80 mg tablet Take 1 Tab by mouth daily. Indications: mixed hyperlipidemia 30 Tab 3     No current facility-administered medications on file prior to visit. Allergies:  No Known Allergies      Review of Systems:  Denies fever, chills, sweats  Denies chest pain, MASON, palpitations, LE edema  Denies dysuria, hematuria  Denies SI or HI      Objective:     Vitals:    05/22/19 1453   BP: (P) 129/82   Pulse: (P) 79   Resp: 16   Temp: (P) 98.4 °F (36.9 °C)   TempSrc: (P) Oral   SpO2: (P) 95%   Weight: 192 lb (87.1 kg)   Height: 5' 9\" (1.753 m)       Physical Exam:  General appearance - alert, well appearing, and in no distress  Mental status - alert, oriented to person, place, and time, depressed mood, affect appropriate to mood  Chest - clear to auscultation, no wheezes, rales or rhonchi, symmetric air entry  Heart - normal rate, regular rhythm, normal S1, S2, no murmurs, rubs, clicks or gallops      Recent Labs:  No results found for this or any previous visit (from the past 12 hour(s)). Assessment and Plan:   Pt is a 50y.o. year old male,      ICD-10-CM ICD-9-CM    1. Primary insomnia F51.01 307.42 traZODone (DESYREL) 50 mg tablet   2. Major depressive disorder, single episode, moderate (HCC) F32.1 296.22 buPROPion XL (WELLBUTRIN XL) 300 mg XL tablet   3. Nocturia R35.1 788.43 tamsulosin (FLOMAX) 0.4 mg capsule   4. Erectile dysfunction, unspecified erectile dysfunction type N52.9 607.84 tadalafil (CIALIS) 5 mg tablet     Improving on Wellbutrin  Increase Wellbutrin XR to 300mg daily    Trial Trazodone for sleep    Refill Flomax     Follow up in 3 months    Demetrius Morejon MD      I have discussed the diagnosis with the patient and the intended plan as seen in the above orders.   The patient has received an after-visit summary and questions were answered concerning future plans.

## 2019-07-29 DIAGNOSIS — F51.01 PRIMARY INSOMNIA: ICD-10-CM

## 2019-07-29 RX ORDER — TRAZODONE HYDROCHLORIDE 50 MG/1
TABLET ORAL
Qty: 30 TAB | Refills: 1 | OUTPATIENT
Start: 2019-07-29

## 2019-07-29 NOTE — TELEPHONE ENCOUNTER
Started trial of Trazodone at last visit. Will call to see if medication is helping before refilling.

## 2019-08-06 NOTE — TELEPHONE ENCOUNTER
----- Message from Milton Ilsas sent at 8/6/2019 10:27 AM EDT -----  Regarding: Dr. Randa Angelucci Telephone  Contact: [phone]  Medication Refill    Caller (if not patient): Alejandro Antony      Relationship of caller (if not patient): Patient      Best contact number(s): [phone]      Name of medication and dosage if known: Trazodone 50 mg       Is patient out of this medication (yes/no): Yes      Pharmacy name:[facility]     Pharmacy listed in chart? (yes/no): yes  Pharmacy phone number: [phone]    Details to clarify the request:    Patient also needs to speak with the nurse in regards to a personal matter.          Milton Islas

## 2019-08-08 NOTE — TELEPHONE ENCOUNTER
Patient called back for status of the refill request. Call transferred to nurse Sindy Lebron due to needing more information to refill medication.

## 2019-08-12 RX ORDER — TRAZODONE HYDROCHLORIDE 50 MG/1
50 TABLET ORAL
Qty: 30 TAB | Refills: 0 | Status: SHIPPED | OUTPATIENT
Start: 2019-08-12 | End: 2019-09-25 | Stop reason: SDUPTHER

## 2019-08-29 ENCOUNTER — OFFICE VISIT (OUTPATIENT)
Dept: FAMILY MEDICINE CLINIC | Age: 49
End: 2019-08-29

## 2019-08-29 VITALS
WEIGHT: 205 LBS | OXYGEN SATURATION: 95 % | BODY MASS INDEX: 30.36 KG/M2 | RESPIRATION RATE: 18 BRPM | HEIGHT: 69 IN | DIASTOLIC BLOOD PRESSURE: 83 MMHG | TEMPERATURE: 95 F | HEART RATE: 92 BPM | SYSTOLIC BLOOD PRESSURE: 125 MMHG

## 2019-08-29 DIAGNOSIS — R30.0 DYSURIA: Primary | ICD-10-CM

## 2019-08-29 DIAGNOSIS — J01.00 ACUTE NON-RECURRENT MAXILLARY SINUSITIS: ICD-10-CM

## 2019-08-29 LAB
BILIRUB UR QL STRIP: NEGATIVE
GLUCOSE UR-MCNC: NEGATIVE MG/DL
KETONES P FAST UR STRIP-MCNC: NEGATIVE MG/DL
PH UR STRIP: 5.5 [PH] (ref 4.6–8)
PROT UR QL STRIP: NEGATIVE
SP GR UR STRIP: 1 (ref 1–1.03)
UA UROBILINOGEN AMB POC: NORMAL (ref 0.2–1)
URINALYSIS CLARITY POC: CLEAR
URINALYSIS COLOR POC: YELLOW
URINE BLOOD POC: NEGATIVE
URINE LEUKOCYTES POC: NEGATIVE
URINE NITRITES POC: NEGATIVE

## 2019-08-29 RX ORDER — AMOXICILLIN AND CLAVULANATE POTASSIUM 500; 125 MG/1; MG/1
1 TABLET, FILM COATED ORAL 2 TIMES DAILY
Qty: 20 TAB | Refills: 0 | Status: SHIPPED | OUTPATIENT
Start: 2019-08-29 | End: 2019-09-08

## 2019-08-29 NOTE — PROGRESS NOTES
Chief Complaint   Patient presents with   Rahat Carney     Patient present with cough, stuffiness x 1 week    Side Pain     Left side pain. Frequent and difficulty urinating with weak flow x 4 months     1. Have you been to the ER, urgent care clinic since your last visit? Hospitalized since your last visit? no    2. Have you seen or consulted any other health care providers outside of the [address] [place] [address] since your last visit? Include any pap smears or colon screening.   no    Visit Vitals  /83 (BP 1 Location: Left arm, BP Patient Position: Sitting)   Pulse 92   Temp 95 °F (35 °C) (Oral)   Resp 18   Ht 5' 9\" (1.753 m)   Wt 205 lb (93 kg)   SpO2 95%   BMI 30.27 kg/m²

## 2019-08-29 NOTE — PROGRESS NOTES
Atnonia Vasquez  52 y.o. male  1970  [address] [facility],[address] [address]  [id number]   [address]: Progress Note  Damien Hope MD       Encounter Date: 8/29/2019    Chief Complaint   Patient presents with   Vevelyn Rm     Patient present with cough, stuffiness x 1 week    Side Pain     Left side pain. Frequent and difficulty urinating with weak flow x 4 months     History of Present Illness   Antonia Vasquez is a 52 y.o. male who presents to clinic today for 1 week, tried dayquil, not getting better. Chronic left side pain and bladder pain. Cystoscopy by urology a couple months ago. getting worse, frequent urination, weak flow. Review of Systems   Review of Systems - General ROS: negative for - fever  ENT ROS: positive for - nasal congestion and sinus pain  Gastrointestinal ROS: positive for - abdominal pain  negative for - diarrhea or nausea/vomiting  Genito-Urinary ROS: positive for - urinary frequency/urgency and weak stream  Vitals/Objective:     Vitals:    08/29/19 1732   BP: 125/83   Pulse: 92   Resp: 18   Temp: 95 °F (35 °C)   TempSrc: Oral   SpO2: 95%   Weight: 205 lb (93 kg)   Height: 5' 9\" (1.753 m)     Body mass index is 30.27 kg/m². General: Patient alert and oriented and in NAD  HEENT: PER/EOMI, no conjunctival pallor or scleral icterus. No thyromegaly or cervical lymphadenopathy, Serous otitis left ear. TMs normal.  Erythematous posterior pharynx. Tender maxillary sinuses. Heart: Regular rate and rhythm, No murmurs, rubs or gallops.   2+ peripheral pulses  Lungs: Clear to auscultation bilaterally, no wheezing, rales or rhonchi  Abd: +BS, non-tender, non-distended    Recent Results (from the past 24 hour(s))   AMB POC URINALYSIS DIP STICK AUTO W/O MICRO    Collection Time: 08/29/19  6:11 PM   Result Value Ref Range    Color (UA POC) Yellow     Clarity (UA POC) Clear     Glucose (UA POC) Negative Negative    Bilirubin (UA POC) Negative Negative    Ketones (UA POC) Negative Negative    Specific gravity (UA POC) 1.005 1.001 - 1.035    Blood (UA POC) Negative Negative    pH (UA POC) 5.5 4.6 - 8.0    Protein (UA POC) Negative Negative    Urobilinogen (UA POC) 0.2 mg/dL 0.2 - 1    Nitrites (UA POC) Negative Negative    Leukocyte esterase (UA POC) Negative Negative         Assessment and Plan:   1. Dysuria  Chronic and worsening. Has seen urology about 5 months ago with cystoscopy. On flomax 2 tabs daily. Not taking Cialis. UA normal today. Recommend follow-up back with urologist.  F/U with PCP for other chronic issues. - AMB POC URINALYSIS DIP STICK AUTO W/O MICRO    2. Acute non-recurrent maxillary sinusitis  Tender sinuses, serous otitis media. Recommend Flonase and pseudoephedrine. Rx for Augmentin if no improvement. - amoxicillin-clavulanate (AUGMENTIN) 500-125 mg per tablet; Take 1 Tab by mouth two (2) times a day for 10 days. Dispense: 20 Tab; Refill: 0      I have discussed the diagnosis with the patient and the intended plan as seen in the above orders. he has expressed understanding. The patient has received an after-visit summary and questions were answered concerning future plans. I have discussed medication side effects and warnings with the patient as well. Follow-up and Dispositions  ·   Return if symptoms worsen or fail to improve. Electronically Signed: Damien Hope MD     History   Patients past medical, surgical and family histories were reviewed and updated.     Past Medical History:   Diagnosis Date    DJD (degenerative joint disease) of cervical spine      Past Surgical History:   Procedure Laterality Date    HX ORTHOPAEDIC  1994    ankle surgery     Family History   Problem Relation Age of Onset    Diabetes Mother     Hypertension Mother     Diabetes Father     Hypertension Father      Social History     Socioeconomic History    Marital status:      Spouse name: Not on file    Number of children: Not on file    Years of education: Not on file    Highest education level: Not on file   Occupational History    Not on file   Social Needs    Financial resource strain: Not on file    Food insecurity:     Worry: Not on file     Inability: Not on file    Transportation needs:     Medical: Not on file     Non-medical: Not on file   Tobacco Use    Smoking status: Former Smoker     Last attempt to quit: 2000     Years since quittin.8    Smokeless tobacco: Never Used   Substance and Sexual Activity    Alcohol use: Yes     Comment: socially    Drug use: No    Sexual activity: Yes   Lifestyle    Physical activity:     Days per week: Not on file     Minutes per session: Not on file    Stress: Not on file   Relationships    Social connections:     Talks on phone: Not on file     Gets together: Not on file     Attends Yazidism service: Not on file     Active member of club or organization: Not on file     Attends meetings of clubs or organizations: Not on file     Relationship status: Not on file    Intimate partner violence:     Fear of current or ex partner: Not on file     Emotionally abused: Not on file     Physically abused: Not on file     Forced sexual activity: Not on file   Other Topics Concern    Not on file   Social History Narrative    Not on file            Current Medications/Allergies     Current Outpatient Medications   Medication Sig Dispense Refill    traZODone (DESYREL) 50 mg tablet Take 1 Tab by mouth nightly. 30 Tab 0    buPROPion XL (WELLBUTRIN XL) 300 mg XL tablet TAKE 1 TABLET BY MOUTH EVERY DAY IN THE MORNING 90 Tab 1    tadalafil (CIALIS) 5 mg tablet Take 3 Tabs by mouth daily as needed for Other (ED). 30 Tab 3    pantoprazole (PROTONIX) 40 mg tablet Take 1 Tab by mouth daily. 30 Tab 3    polyethylene glycol (MIRALAX) 17 gram packet Take 1 Packet by mouth daily. 30 Packet 2    tamsulosin (FLOMAX) 0.4 mg capsule Take 2 Caps by mouth daily.  180 Cap 1    plecanatide (TRULANCE) 3 mg tab Take  by mouth.  docusate sodium 100 mg tab Take 100 mg by mouth daily. 30 Tab 3    atorvastatin (LIPITOR) 80 mg tablet Take 1 Tab by mouth daily.  Indications: mixed hyperlipidemia 30 Tab 3     No Known Allergies

## 2019-09-25 DIAGNOSIS — F51.01 PRIMARY INSOMNIA: ICD-10-CM

## 2019-09-25 RX ORDER — TRAZODONE HYDROCHLORIDE 50 MG/1
TABLET ORAL
Qty: 30 TAB | Refills: 1 | Status: SHIPPED | OUTPATIENT
Start: 2019-09-25 | End: 2019-11-27 | Stop reason: SDUPTHER

## 2019-11-27 DIAGNOSIS — F51.01 PRIMARY INSOMNIA: ICD-10-CM

## 2019-11-29 RX ORDER — TRAZODONE HYDROCHLORIDE 50 MG/1
TABLET ORAL
Qty: 30 TAB | Refills: 1 | Status: SHIPPED | OUTPATIENT
Start: 2019-11-29 | End: 2020-01-28

## 2020-01-27 DIAGNOSIS — F51.01 PRIMARY INSOMNIA: ICD-10-CM

## 2020-01-28 RX ORDER — TRAZODONE HYDROCHLORIDE 50 MG/1
TABLET ORAL
Qty: 30 TAB | Refills: 1 | Status: SHIPPED | OUTPATIENT
Start: 2020-01-28 | End: 2020-03-25

## 2020-03-25 DIAGNOSIS — F51.01 PRIMARY INSOMNIA: ICD-10-CM

## 2020-03-25 RX ORDER — TRAZODONE HYDROCHLORIDE 50 MG/1
TABLET ORAL
Qty: 30 TAB | Refills: 1 | Status: SHIPPED | OUTPATIENT
Start: 2020-03-25 | End: 2020-05-18

## 2020-05-16 DIAGNOSIS — F51.01 PRIMARY INSOMNIA: ICD-10-CM

## 2020-05-18 RX ORDER — TRAZODONE HYDROCHLORIDE 50 MG/1
TABLET ORAL
Qty: 30 TAB | Refills: 1 | Status: SHIPPED | OUTPATIENT
Start: 2020-05-18 | End: 2020-07-17

## 2020-06-15 ENCOUNTER — TELEPHONE (OUTPATIENT)
Dept: FAMILY MEDICINE CLINIC | Age: 50
End: 2020-06-15

## 2020-06-15 NOTE — TELEPHONE ENCOUNTER
Called patient and scheduled him for telemed visit on:   The following appointments have been successfully scheduled:    Date/time Wednesday, June 17, 2020 08:45 AM  Patient Zee Corona 1970 (24YE M) #[id number] S#[id number]  Department [facility]MAIN [facility]  Appointment type Telemedicine 30 My Chart  Provider [address]  Appointment type notes Telemedicine 30 My Chart  For visits related to Randy    Patient stating his heartburn is so severe that it's \"killing him\"   I told patient that if his symptoms bother him that bad prior to his appt he needed to seek urgent care at that time    Patient verbalized understanding     Close enc

## 2020-06-15 NOTE — TELEPHONE ENCOUNTER
----- Message from Kaila Chen sent at 6/15/2020 12:52 PM EDT -----  Regarding: Zander Jarrett MD  Appointment not available    Caller's first and last name and relationship to patient (if not the patient):      Best contact number:  [phone]       Preferred date and time: Earliest Available       Scheduled appointment date and time: N/A      Reason for appointment: Heartburn      Details to clarify the request: pt requestin ni-office visit       Kaila Chen

## 2020-06-17 ENCOUNTER — VIRTUAL VISIT (OUTPATIENT)
Dept: FAMILY MEDICINE CLINIC | Age: 50
End: 2020-06-17

## 2020-06-17 DIAGNOSIS — K21.9 GASTROESOPHAGEAL REFLUX DISEASE, ESOPHAGITIS PRESENCE NOT SPECIFIED: ICD-10-CM

## 2020-06-17 RX ORDER — PANTOPRAZOLE SODIUM 40 MG/1
40 TABLET, DELAYED RELEASE ORAL DAILY
Qty: 30 TAB | Refills: 1 | Status: SHIPPED | OUTPATIENT
Start: 2020-06-17 | End: 2021-05-28

## 2020-06-17 NOTE — PROGRESS NOTES
Mayte Cerda  52 y.o. male  1970  [address]  [id number]   [address]:    Telemedicine Progress Note  Lorrie Harden MD       Encounter Date and Time: June 17, 2020 at 8:47 AM    Consent:  He and/or the health care decision maker is aware that that he may receive a bill for this telephone service, depending on his insurance coverage, and has provided verbal consent to proceed: Yes    Chief Complaint   Patient presents with    GERD     History of Present Illness   Mayte Cerda is a 52 y.o. male was evaluated by synchronous (real-time) audio-video technology from home, through the [facility]  Patient Portal.    Patient would like to discuss the following;    -GERD: This is a chronic issue, present for at least 2-3 years. Symptoms generally characterized by epigastric burning sensation. No clear relationship with food or activities. He saw Dr. Schmidt Client María Allen) about 2 years ago with similar symptoms and had an unremarkable EGD. Was advised to take PPI which he took briefly with improvement in symptoms but stopped taking. Pt however states that over the past 6-8 months, he has been having symptoms more frequently. He cut down on spicy food and other known triggers. But last week, symptoms got so \"bad\" that he had to take Prilosec which helped. Denies any pain at this visit. Denies any weight loss, fever, chills, dysphagia, chronic cough, hematemesis, blood in the stool, early satiety or hoarseness. Pt would like refill of his Pantoprazole sent to the pharmacy. Review of Systems   ROS    Review of systems:  Items bolded if positive.   Constitutional: Fever, chills, night sweats, weight loss  Cardiovascular: Chest pain, palpitations, syncope, lower extremity edema, orthopnea, paroxysmal nocturnal dyspnea  Pulmonary: Shortness of breath, dyspnea on exertion, cough, hemoptysis, wheezing  GI: Positive for acid reflux    Vitals/Objective:     General: alert, cooperative, no distress   Mental  status: mental status: alert, oriented to person, place, and time, normal mood, behavior, speech, dress, motor activity, and thought processes   Resp: resp: normal effort and no respiratory distress   Neuro: neuro: no gross deficits   Skin: skin: no discoloration or lesions of concern on visible areas   Due to this being a TeleHealth evaluation, many elements of the physical examination are unable to be assessed. Assessment and Plan:         ICD-10-CM ICD-9-CM    1. Gastroesophageal reflux disease, esophagitis presence not specified K21.9 530.81 pantoprazole (PROTONIX) 40 mg tablet       GERD, chronic: No alarm symptoms. Had EGD (8/27/2018) by Dr. Aamir Antonio and it was unremarkable. I think patient needs better control of his symptoms and might be candidate for long term therapy    -Will go ahead and prescribe Pantoprazole 40mg, take one tablet daily   -Plan is to decrease to 20 mg daily later if patient is doing well and eventually wean off if tolerated. -Advised to eat several small meals instead of two or three large meals.   - Advised to wait 2 to 3 hours before lying down after eating.   - Spicy foods, foods that have a lot of acid (like tomatoes and oranges), coffee, chocolate, mint, and alcohol  can make GERD symptoms worse. Advised to limit these foods to see if symptoms get better. - Consider raising head of bed should you have symptoms at night  - Losing just 5 to 10 pounds can help also improve the symptoms. - Will consider checking for H. Pylori test to rule out this as the cause If test come back negative and symptoms persist despite medication and lifestyle modification, I  may consider referring him back to GI  -  RTC if any new or worsening symptoms       We discussed the expected course, resolution and complications of the diagnosis(es) in detail.   Medication risks, benefits, costs, interactions, and alternatives were discussed as indicated. I advised him to contact the office if his condition worsens, changes or fails to improve as anticipated. He expressed understanding with the diagnosis(es) and plan. Patient understands that this encounter was a temporary measure, and the importance of further follow up and examination was emphasized. Patient verbalized understanding. Patient discussed with Dr. Nadia Stein (supervising provider)    Electronically Signed: Ponce Gosselin, MD    Providers location when delivering service (clinic, hospital, home): Home    CPT Codes 25982-20516 for Established Patients may apply to this Telehealth Visit. POS code: 18. Modifier GT      Pursuant to the emergency declaration under the [address] [place] [address] waiver authority and the [employer] Act, this Virtual  Visit was conducted, with patient's consent, to reduce the patient's risk of exposure to COVID-19 and provide continuity of care for an established patient. Services were provided through a video synchronous discussion virtually to substitute for in-person clinic visit. History   Patients past medical, surgical and family histories were reviewed and updated.       Past Medical History:   Diagnosis Date    DJD (degenerative joint disease) of cervical spine      Past Surgical History:   Procedure Laterality Date    HX ORTHOPAEDIC  1994    ankle surgery     Family History   Problem Relation Age of Onset    Diabetes Mother     Hypertension Mother     Diabetes Father     Hypertension Father      Social History     Socioeconomic History    Marital status:      Spouse name: Not on file    Number of children: Not on file    Years of education: Not on file    Highest education level: Not on file   Occupational History    Not on file   Social Needs    Financial resource strain: Not on file    Food insecurity     Worry: Not on file     Inability: Not on file    Transportation needs     Medical: Not on file     Non-medical: Not on file   Tobacco Use    Smoking status: Former Smoker     Last attempt to quit: 2000     Years since quittin.6    Smokeless tobacco: Never Used   Substance and Sexual Activity    Alcohol use: Yes     Comment: socially    Drug use: No    Sexual activity: Yes   Lifestyle    Physical activity     Days per week: Not on file     Minutes per session: Not on file    Stress: Not on file   Relationships    Social connections     Talks on phone: Not on file     Gets together: Not on file     Attends [employer] service: Not on file     Active member of club or organization: Not on file     Attends meetings of clubs or organizations: Not on file     Relationship status: Not on file    Intimate partner violence     Fear of current or ex partner: Not on file     Emotionally abused: Not on file     Physically abused: Not on file     Forced sexual activity: Not on file   Other Topics Concern    Not on file   Social History Narrative    Not on file     Patient Active Problem List   Diagnosis Code    HLD (hyperlipidemia) E78.5    Chronic lower back pain M54.5, G89.29    Degenerative disc disease at L5-S1 level M51.36    Major depressive disorder, single episode, moderate ([facility] [address] F32.1    Anxiety F41.9    Hx of colonoscopy with polypectomy Z98.890, Z86.010          Current Medications/Allergies   Medications and Allergies reviewed:    Current Outpatient Medications   Medication Sig Dispense Refill    pantoprazole (PROTONIX) 40 mg tablet Take 1 Tab by mouth daily. 30 Tab 1    traZODone (DESYREL) 50 mg tablet TAKE 1 TABLET BY MOUTH EVERY DAY EVERY NIGHT 30 Tab 1    buPROPion XL (WELLBUTRIN XL) 300 mg XL tablet TAKE 1 TABLET BY MOUTH EVERY DAY IN THE MORNING 90 Tab 1    tamsulosin (FLOMAX) 0.4 mg capsule Take 2 Caps by mouth daily.  180 Cap 1    polyethylene glycol (MIRALAX) 17 gram packet Take 1 Packet by mouth daily.  30 Packet 2     No Known Allergies

## 2020-06-18 NOTE — PROGRESS NOTES
[address] Medicine Residency Attending Addendum:  Dr. Rito Greene MD,  the patient and I were not physically present during this encounter. The resident and I are concurrently monitoring the patient care through appropriate telecommunication technology. I discussed the findings, assessment and plan with the resident and agree with the resident's findings and plan as documented in the resident's note.       Antionette Saint, MD

## 2020-07-17 DIAGNOSIS — F51.01 PRIMARY INSOMNIA: ICD-10-CM

## 2020-07-17 RX ORDER — TRAZODONE HYDROCHLORIDE 50 MG/1
TABLET ORAL
Qty: 30 TAB | Refills: 1 | Status: SHIPPED | OUTPATIENT
Start: 2020-07-17 | End: 2020-10-19

## 2020-10-09 ENCOUNTER — TELEPHONE (OUTPATIENT)
Dept: FAMILY MEDICINE CLINIC | Age: 50
End: 2020-10-09

## 2020-10-09 NOTE — TELEPHONE ENCOUNTER
This patient want to speak with you about getting a CPE done with you & to discuss the pain in his side that he is having & other issues. ... You do not have any openings for a complete physical for this month. ... Please advise. ...

## 2020-10-19 ENCOUNTER — OFFICE VISIT (OUTPATIENT)
Dept: FAMILY MEDICINE CLINIC | Age: 50
End: 2020-10-19
Payer: COMMERCIAL

## 2020-10-19 VITALS
BODY MASS INDEX: 34.44 KG/M2 | OXYGEN SATURATION: 95 % | SYSTOLIC BLOOD PRESSURE: 119 MMHG | WEIGHT: 227.25 LBS | HEART RATE: 86 BPM | HEIGHT: 68 IN | RESPIRATION RATE: 16 BRPM | TEMPERATURE: 96.4 F | DIASTOLIC BLOOD PRESSURE: 84 MMHG

## 2020-10-19 DIAGNOSIS — E78.49 OTHER HYPERLIPIDEMIA: ICD-10-CM

## 2020-10-19 DIAGNOSIS — F51.01 PRIMARY INSOMNIA: ICD-10-CM

## 2020-10-19 DIAGNOSIS — F32.1 MAJOR DEPRESSIVE DISORDER, SINGLE EPISODE, MODERATE (HCC): ICD-10-CM

## 2020-10-19 DIAGNOSIS — Z00.00 ANNUAL PHYSICAL EXAM: Primary | ICD-10-CM

## 2020-10-19 DIAGNOSIS — R10.9 FLANK PAIN: ICD-10-CM

## 2020-10-19 LAB
BILIRUB UR QL STRIP: NEGATIVE
GLUCOSE UR-MCNC: NEGATIVE MG/DL
KETONES P FAST UR STRIP-MCNC: NEGATIVE MG/DL
PH UR STRIP: 5.5 [PH] (ref 4.6–8)
PROT UR QL STRIP: NEGATIVE
SP GR UR STRIP: 1.02 (ref 1–1.03)
UA UROBILINOGEN AMB POC: NORMAL (ref 0.2–1)
URINALYSIS CLARITY POC: CLEAR
URINALYSIS COLOR POC: YELLOW
URINE BLOOD POC: NEGATIVE
URINE LEUKOCYTES POC: NEGATIVE
URINE NITRITES POC: NEGATIVE

## 2020-10-19 PROCEDURE — 99396 PREV VISIT EST AGE 40-64: CPT | Performed by: FAMILY MEDICINE

## 2020-10-19 PROCEDURE — 90471 IMMUNIZATION ADMIN: CPT | Performed by: FAMILY MEDICINE

## 2020-10-19 PROCEDURE — 90472 IMMUNIZATION ADMIN EACH ADD: CPT | Performed by: FAMILY MEDICINE

## 2020-10-19 PROCEDURE — 90686 IIV4 VACC NO PRSV 0.5 ML IM: CPT | Performed by: FAMILY MEDICINE

## 2020-10-19 PROCEDURE — 81003 URINALYSIS AUTO W/O SCOPE: CPT | Performed by: FAMILY MEDICINE

## 2020-10-19 PROCEDURE — 90750 HZV VACC RECOMBINANT IM: CPT | Performed by: FAMILY MEDICINE

## 2020-10-19 RX ORDER — TRAZODONE HYDROCHLORIDE 150 MG/1
150 TABLET ORAL
Qty: 90 TAB | Refills: 1 | Status: SHIPPED | OUTPATIENT
Start: 2020-10-19 | End: 2022-03-15

## 2020-10-19 RX ORDER — BUPROPION HYDROCHLORIDE 150 MG/1
TABLET ORAL
Qty: 90 TAB | Refills: 1 | Status: SHIPPED | OUTPATIENT
Start: 2020-10-19 | End: 2021-05-28

## 2020-10-19 NOTE — PROGRESS NOTES
Subjective:   Lizzy Ron is an 48 y.o. male who presents for complete physical exam.    Doing well. Sleep still not doing well. Diet: Poor. Not good since starting new job. Exercise: Not exercising     Sleep has not been well overall. Stopped taking the Wellbutrin. Reports issues with sleep all the time. Falls asleep, but wakes and tosses and turns. Taking Trazadone with minimal relief. Past 4-5 months noticing more wheezing and shortness of breath with walking uphill. Really depressed after turning 50. Health Maintenance reviewed -  Colonoscopy (age 54-65): UTD; due 2023. AAA Screening (age 73-68 if ever smoked): Not aged in    [address] (age 46-80 w. 30pk/yr hx and currently smoking, or quit < 15 yrs ago): Not aged in    HIV testing: Done previously per patient    Hepatitis C testing (born between [birth year]-77):  Done previously per patient    Allergies - reviewed:   No Known Allergies      Medications - reviewed:  Current Outpatient Medications   Medication Sig    traZODone (DESYREL) 50 mg tablet TAKE 1 TABLET BY MOUTH EVERY DAY EVERY NIGHT    pantoprazole (PROTONIX) 40 mg tablet Take 1 Tab by mouth daily.  buPROPion XL (WELLBUTRIN XL) 300 mg XL tablet TAKE 1 TABLET BY MOUTH EVERY DAY IN THE MORNING    tamsulosin (FLOMAX) 0.4 mg capsule Take 2 Caps by mouth daily.  polyethylene glycol (MIRALAX) 17 gram packet Take 1 Packet by mouth daily. No current facility-administered medications for this visit.           Past Medical History - reviewed:  Past Medical History:   Diagnosis Date    DJD (degenerative joint disease) of cervical spine          Past Surgical History - reviewed:  Past Surgical History:   Procedure Laterality Date    HX ORTHOPAEDIC  1994    ankle surgery    HX PROSTATE SURGERY  06/2019         Family History - reviewed:  Family History   Problem Relation Age of Onset    Diabetes Mother     Hypertension Mother     Diabetes Father     Hypertension Father          Social History - reviewed:  Social History     Socioeconomic History    Marital status:      Spouse name: Not on file    Number of children: Not on file    Years of education: Not on file    Highest education level: Not on file   Occupational History    Not on file   Social Needs    Financial resource strain: Not on file    Food insecurity     Worry: Not on file     Inability: Not on file    Transportation needs     Medical: Not on file     Non-medical: Not on file   Tobacco Use    Smoking status: Former Smoker     Last attempt to quit: 2000     Years since quittin.9    Smokeless tobacco: Never Used   Substance and Sexual Activity    Alcohol use: Yes     Comment: socially    Drug use: No    Sexual activity: Yes   Lifestyle    Physical activity     Days per week: Not on file     Minutes per session: Not on file    Stress: Not on file   Relationships    Social connections     Talks on phone: Not on file     Gets together: Not on file     Attends [employer] service: Not on file     Active member of club or organization: Not on file     Attends meetings of clubs or organizations: Not on file     Relationship status: Not on file    Intimate partner violence     Fear of current or ex partner: Not on file     Emotionally abused: Not on file     Physically abused: Not on file     Forced sexual activity: Not on file   Other Topics Concern    Not on file   Social History Narrative    Not on file         Immunizations - reviewed:   Immunization History   Administered Date(s) Administered    Influenza Nasal Vaccine 10/27/2015    Influenza Vaccine [employer]) PF (>6 Mo Flulaval, Fluarix, and >3 Yrs [address] [place] [address]) 2017, 2019, 10/19/2020    Pneumococcal Polysaccharide (PPSV-23) 2017    Zoster Recombinant 10/19/2020     Review of systems:  Items bolded if positive.   Constitutional: Fever, chills, night sweats, weight loss, lymphadenopathy, fatigue  HEENT: Vision change, eye pain, rhinorrhea, sinus pain, epistaxis, dysphagia, change in hearing, tinnitus, vertigo.    Endocrine: Weight change, heat/ cold intolerance, tremor, insomnia, polyuria, polydipsia, polyphagia, abnl hair growth, nail changes  Cardiovascular: Chest pain, palpitations, syncope, lower extremity edema, orthopnea, paroxysmal nocturnal dyspnea  Pulmonary: Shortness of breath, dyspnea on exertion, cough, hemoptysis, wheezing  GI: Nausea, vomiting, diarrhea, melena, hematochezia, change in appetite, abdominal pain, change in bowel habits or stools  : Dysuria, frequency, urgency, incontinence, hematuria, nocturia  Musculoskeletal: joint swelling or pain, muscle pain, back pain  Skin:  Rash, New/growing/changing skin lesions  Neurologic: Headache, muscle weakness, paresthesias, anesthesia, ataxia, change in speech, change in gait   Psychiatric: depression, anxiety, hallucinations, trevon, SI/HI        Objective:     Visit Vitals  /84 (BP 1 Location: Left arm, BP Patient Position: Sitting)   Pulse 86   Temp (!) 96.4 °F (35.8 °C) (Temporal)   Resp 16   Ht 5' 8\" (1.727 m)   Wt 227 lb 4 oz (103.1 kg)   SpO2 95%   BMI 34.55 kg/m²       General appearance - alert, well appearing, and in no distress and overweight  Eyes - pupils equal and reactive, extraocular eye movements intact  Neck - supple, no significant adenopathy, thyroid exam: thyroid is normal in size without nodules or tenderness  Chest - clear to auscultation, no wheezes, rales or rhonchi, symmetric air entry  Heart - normal rate, regular rhythm, normal S1, S2, no murmurs, rubs, clicks or gallops  Abdomen - soft, nontender, nondistended, no masses or organomegaly  Neurological - alert, oriented, normal speech, no focal findings or movement disorder noted  Musculoskeletal - no joint tenderness, deformity or swelling  Extremities - peripheral pulses normal, no pedal edema, no clubbing or cyanosis  Skin - normal coloration and turgor, no rashes, no suspicious skin lesions noted    Recent Results (from the past 12 hour(s))   AMB POC URINALYSIS DIP STICK AUTO W/O MICRO    Collection Time: 10/19/20 10:06 AM   Result Value Ref Range    Color (UA POC) Yellow     Clarity (UA POC) Clear     Glucose (UA POC) Negative Negative    Bilirubin (UA POC) Negative Negative    Ketones (UA POC) Negative Negative    Specific gravity (UA POC) 1.025 1.001 - 1.035    Blood (UA POC) Negative Negative    pH (UA POC) 5.5 4.6 - 8.0    Protein (UA POC) Negative Negative    Urobilinogen (UA POC) 0.2 mg/dL 0.2 - 1    Nitrites (UA POC) Negative Negative    Leukocyte esterase (UA POC) Negative Negative         Assessment:       ICD-10-CM ICD-9-CM    1. Annual physical exam  Z00.00 V70.0 CBC W/O DIFF      METABOLIC PANEL, COMPREHENSIVE      LIPID PANEL      ZOSTER VACC RECOMBINANT ADJUVANTED      INFLUENZA VIRUS VAC QUAD,SPLIT,PRESV FREE SYRINGE IM   2. Flank pain  R10.9 789.09 AMB POC URINALYSIS DIP STICK AUTO W/O MICRO   3. Other hyperlipidemia  E78.49 272.4 CBC W/O DIFF      METABOLIC PANEL, COMPREHENSIVE      LIPID PANEL   4. Primary insomnia  F51.01 307.42    5. Major depressive disorder, single episode, moderate (HCC)  F32.1 296.22            Plan:   · Counseled re: diet, exercise, healthy lifestyle    · Appropriate labs, vaccines, imaging studies, and referrals ordered as listed above    · Discussed the patient's BMI with him. The BMI follow up plan is as follows: I have counseled this patient on diet and exercise regimens. · Will trial Wellbutrin again at 150mg daily. · Increase Trazodone to 150mg nightly    · Refer to Sleep Medicine for additional eval    · Follow up in 4-6 wks for anxiety/ depression/ insomnia      I have discussed the diagnosis with the patient and the intended plan as seen in the above orders. The patient has received an after-visit summary and questions were answered concerning future plans.   I have discussed medication side effects and warnings with the patient as well. Informed pt to return to the office if new symptoms arise.       Zulema Yip MD

## 2020-10-19 NOTE — PROGRESS NOTES
Chief Complaint   Patient presents with    Well Male     Patient presents for a complete physical; also complaining or Left side pain x years. Vitals:    10/19/20 0956   BP: 119/84   BP 1 Location: Left arm   BP Patient Position: Sitting   Pulse: 86   Resp: 16   Temp: (!) 96.4 °F (35.8 °C)   TempSrc: Temporal   SpO2: 95%   Weight: 227 lb 4 oz (103.1 kg)   Height: 5' 8\" (1.727 m)     1. Have you been to the ER, urgent care clinic since your last visit? Hospitalized since your last visit? No     2. Have you seen or consulted any other health care providers outside of the [address] [place] [address] since your last visit? Include any pap smears or colon screening.  No

## 2020-10-19 NOTE — PATIENT INSTRUCTIONS
Low Sodium Diet (2,000 Milligram): Care Instructions Your Care Instructions Too much sodium causes your body to hold on to extra water. This can raise your blood pressure and force your heart and kidneys to work harder. In very serious cases, this could cause you to be put in the hospital. It might even be life-threatening. By limiting sodium, you will feel better and lower your risk of serious problems. The most common source of sodium is salt. People get most of the salt in their diet from canned, prepared, and packaged foods. Fast food and restaurant meals also are very high in sodium. Your doctor will probably limit your sodium to less than 2,000 milligrams (mg) a day. This limit counts all the sodium in prepared and packaged foods and any salt you add to your food. Follow-up care is a key part of your treatment and safety. Be sure to make and go to all appointments, and call your doctor if you are having problems. It's also a good idea to know your test results and keep a list of the medicines you take. How can you care for yourself at home? Read food labels · Read labels on cans and food packages. The labels tell you how much sodium is in each serving. Make sure that you look at the serving size. If you eat more than the serving size, you have eaten more sodium. · Food labels also tell you the Percent Daily Value for sodium. Choose products with low Percent Daily Values for sodium. · Be aware that sodium can come in forms other than salt, including monosodium glutamate (MSG), sodium citrate, and sodium bicarbonate (baking soda). MSG is often added to Asian food. When you eat out, you can sometimes ask for food without MSG or added salt. Buy low-sodium foods · Buy foods that are labeled \"unsalted\" (no salt added), \"sodium-free\" (less than 5 mg of sodium per serving), or \"low-sodium\" (less than 140 mg of sodium per serving).  Foods labeled \"reduced-sodium\" and \"light sodium\" may still have too much sodium. Be sure to read the label to see how much sodium you are getting. · Buy fresh vegetables, or frozen vegetables without added sauces. Buy low-sodium versions of canned vegetables, soups, and other canned goods. Prepare low-sodium meals · Cut back on the amount of salt you use in cooking. This will help you adjust to the taste. Do not add salt after cooking. One teaspoon of salt has about 2,300 mg of sodium. · Take the salt shaker off the table. · Flavor your food with garlic, lemon juice, onion, vinegar, herbs, and spices. Do not use soy sauce, lite soy sauce, steak sauce, onion salt, garlic salt, celery salt, mustard, or ketchup on your food. · Use low-sodium salad dressings, sauces, and ketchup. Or make your own salad dressings and sauces without adding salt. · Use less salt (or none) when recipes call for it. You can often use half the salt a recipe calls for without losing flavor. Other foods such as rice, pasta, and grains do not need added salt. · Rinse canned vegetables, and cook them in fresh water. This removes somebut not allof the salt. · Avoid water that is naturally high in sodium or that has been treated with water softeners, which add sodium. Call your local water company to find out the sodium content of your water supply. If you buy bottled water, read the label and choose a sodium-free brand. Avoid high-sodium foods · Avoid eating: 
? Smoked, cured, salted, and canned meat, fish, and poultry. ? Ham, sharpe, hot dogs, and luncheon meats. ? Regular, hard, and processed cheese and regular peanut butter. ? Crackers with salted tops, and other salted snack foods such as pretzels, chips, and salted popcorn. ? Frozen prepared meals, unless labeled low-sodium. ? Canned and dried soups, broths, and bouillon, unless labeled sodium-free or low-sodium. ? Canned vegetables, unless labeled sodium-free or low-sodium. ? [employer] fries, pizza, tacos, and other fast foods. ? Pickles, olives, ketchup, and other condiments, especially soy sauce, unless labeled sodium-free or low-sodium. Where can you learn more? Go to http://www.gray.com/ Enter G671 in the search box to learn more about \"Low Sodium Diet (2,000 Milligram): Care Instructions. \" Current as of: August 22, 2019               Content Version: 12.6 © 0491-8293 [employer]. Care instructions adapted under license by [employer] (which disclaims liability or warranty for this information). If you have questions about a medical condition or this instruction, always ask your healthcare professional. [address] any warranty or liability for your use of this information.

## 2020-10-20 LAB
ALBUMIN SERPL-MCNC: 3.9 G/DL (ref 3.5–5)
ALBUMIN/GLOB SERPL: 1.2 {RATIO} (ref 1.1–2.2)
ALP SERPL-CCNC: 58 U/L (ref 45–117)
ALT SERPL-CCNC: 42 U/L (ref 12–78)
ANION GAP SERPL CALC-SCNC: 8 MMOL/L (ref 5–15)
AST SERPL-CCNC: 19 U/L (ref 15–37)
BILIRUB SERPL-MCNC: 0.4 MG/DL (ref 0.2–1)
BUN SERPL-MCNC: 17 MG/DL (ref 6–20)
BUN/CREAT SERPL: 13 (ref 12–20)
CALCIUM SERPL-MCNC: 9.2 MG/DL (ref 8.5–10.1)
CHLORIDE SERPL-SCNC: 107 MMOL/L (ref 97–108)
CHOLEST SERPL-MCNC: 295 MG/DL
CO2 SERPL-SCNC: 27 MMOL/L (ref 21–32)
CREAT SERPL-MCNC: 1.36 MG/DL (ref 0.7–1.3)
ERYTHROCYTE [DISTWIDTH] IN BLOOD BY AUTOMATED COUNT: 12.4 % (ref 11.5–14.5)
GLOBULIN SER CALC-MCNC: 3.2 G/DL (ref 2–4)
GLUCOSE SERPL-MCNC: 91 MG/DL (ref 65–100)
HCT VFR BLD AUTO: 46.3 % (ref 36.6–50.3)
HDLC SERPL-MCNC: 45 MG/DL
HDLC SERPL: 6.6 {RATIO} (ref 0–5)
HGB BLD-MCNC: 14.6 G/DL (ref 12.1–17)
LDLC SERPL CALC-MCNC: 194 MG/DL (ref 0–100)
LIPID PROFILE,FLP: ABNORMAL
MCH RBC QN AUTO: 30.7 PG (ref 26–34)
MCHC RBC AUTO-ENTMCNC: 31.5 G/DL (ref 30–36.5)
MCV RBC AUTO: 97.5 FL (ref 80–99)
NRBC # BLD: 0 K/UL (ref 0–0.01)
NRBC BLD-RTO: 0 PER 100 WBC
PLATELET # BLD AUTO: 282 K/UL (ref 150–400)
PMV BLD AUTO: 10.9 FL (ref 8.9–12.9)
POTASSIUM SERPL-SCNC: 4.6 MMOL/L (ref 3.5–5.1)
PROT SERPL-MCNC: 7.1 G/DL (ref 6.4–8.2)
RBC # BLD AUTO: 4.75 M/UL (ref 4.1–5.7)
SODIUM SERPL-SCNC: 142 MMOL/L (ref 136–145)
TRIGL SERPL-MCNC: 280 MG/DL (ref ?–150)
VLDLC SERPL CALC-MCNC: 56 MG/DL
WBC # BLD AUTO: 7.7 K/UL (ref 4.1–11.1)

## 2020-10-21 ENCOUNTER — TELEPHONE (OUTPATIENT)
Dept: FAMILY MEDICINE CLINIC | Age: 50
End: 2020-10-21

## 2020-10-21 DIAGNOSIS — E78.2 MIXED HYPERLIPIDEMIA: Primary | ICD-10-CM

## 2020-10-21 RX ORDER — ATORVASTATIN CALCIUM 40 MG/1
40 TABLET, FILM COATED ORAL DAILY
Qty: 90 TAB | Refills: 3 | Status: SHIPPED | OUTPATIENT
Start: 2020-10-21 | End: 2022-03-08

## 2020-10-21 NOTE — TELEPHONE ENCOUNTER
Called pt to discuss lab results. ID confirmed x 2. LDL cholesterol now > 190. Recommended starting statin and repeating labs in 3 mo. Pt in agreement with plan. Counseled on lifestyle changes and weight loss at office visit. Remaining labs stable.      Gracy Phoenix MD

## 2020-11-12 ENCOUNTER — OFFICE VISIT (OUTPATIENT)
Dept: SLEEP MEDICINE | Age: 50
End: 2020-11-12
Payer: COMMERCIAL

## 2020-11-12 DIAGNOSIS — G47.00 INSOMNIA, UNSPECIFIED TYPE: ICD-10-CM

## 2020-11-12 DIAGNOSIS — G47.33 OSA (OBSTRUCTIVE SLEEP APNEA): Primary | ICD-10-CM

## 2020-11-12 PROCEDURE — 99204 OFFICE O/P NEW MOD 45 MIN: CPT | Performed by: SPECIALIST

## 2020-11-12 NOTE — PROGRESS NOTES
[address] [place]. [place], [address]  Tel.  [phone]  Fax. [address] [facility] 60  [place], [address] [facility]  Tel.  [phone]  Fax. [phone] [address] [facility]   Tel.  [phone]  Fax. [phone]       Chief Complaint       No chief complaint on file. KATE Serna is 48 y.o. male seen for evaluation of a sleep disorder. He presents with a history of snoring and daytime fatigue. He notes [facility]. At that time he was diagnosed with sleep apnea. He was started on CPAP which he may have used for 6 months before discontinuing. Currently he retires at 5: 30 p.m. and awakens with alarm at 5: 30 AM.  He notes that he will awaken frequently throughout the night. He notes frequent dreaming. He is tired on awakening and during the day. He may nap at lunch. He may doze if he is riding as a passenger or if seated and inactive such as reading or watching TV. He has a history of difficulty with sleep initiation for which he has been taking trazodone. The patient has not undergone recent testing for the current problems. ESS: 10  Mod FOSQ: 13          No Known Allergies    Current Outpatient Medications   Medication Sig Dispense Refill    atorvastatin (LIPITOR) 40 mg tablet Take 1 Tab by mouth daily. 90 Tab 3    traZODone (DESYREL) 150 mg tablet Take 1 Tab by mouth nightly. 90 Tab 1    buPROPion XL (WELLBUTRIN XL) 150 mg tablet TAKE 1 TABLET BY MOUTH EVERY DAY IN THE MORNING 90 Tab 1    pantoprazole (PROTONIX) 40 mg tablet Take 1 Tab by mouth daily. 30 Tab 1    tamsulosin (FLOMAX) 0.4 mg capsule Take 2 Caps by mouth daily. 180 Cap 1    polyethylene glycol (MIRALAX) 17 gram packet Take 1 Packet by mouth daily. 27 Packet 2        He  has a past medical history of DJD (degenerative joint disease) of cervical spine.     He  has a past surgical history that includes hx orthopaedic (1994) and hx prostate surgery (06/2019). He family history includes Diabetes in his father and mother; Hypertension in his father and mother. He  reports that he quit smoking about 20 years ago. He has never used smokeless tobacco. He reports current alcohol use. He reports that he does not use drugs. Review of Systems:  Review of Systems   Constitutional: Positive for chills and fever. HENT: Positive for hearing loss and tinnitus. Eyes: Negative for blurred vision and double vision. Respiratory: Positive for shortness of breath. Negative for cough. Cardiovascular: Negative for chest pain and palpitations. Gastrointestinal: Positive for heartburn. Negative for abdominal pain and nausea. Genitourinary: Positive for frequency and urgency. Musculoskeletal: Positive for back pain and joint pain. Negative for neck pain. Skin: Negative for rash. Neurological: Negative for dizziness and headaches. Psychiatric/Behavioral: Negative for depression. The patient is not nervous/anxious. Objective:     Height: 5'8\"  Weight: 220 lb  BMI:33.4  BP:119/80      General:   Conversant, cooperative   Eyes:  Pupils equal and reactive, no nystagmus   Oropharynx:   Mallampati score IV, tongue scalloped       Neck:   No carotid bruits; Chest/Lungs:  Clear on auscultation    CVS:  Normal rate, regular rhythm   Skin:  Warm to touch; no obvious rashes   Neuro:  Speech fluent, face symmetrical, tongue movement normal   Psych:  Normal affect,  normal countenance        Assessment:       ICD-10-CM ICD-9-CM    1. REJI (obstructive sleep apnea)  G47.33 327.23 SLEEP STUDY UNATTENDED, 4 CHANNEL   2. Insomnia, unspecified type  G47.00 780.52      History of snoring and daytime fatigue consistent with sleep disordered breathing. He will be evaluated with a home sleep test.  Results to be reviewed with him. .    Plan:     Orders Placed This Encounter    SLEEP STUDY UNATTENDED, 4 CHANNEL     Order Specific Question:   Reason for Exam     Answer:   history of sleep apnea       * Patient has a history and examination consistent with the diagnosis of sleep apnea. *Home sleep testing was ordered for initial evaluation. * He was provided information on sleep apnea including corresponding risk factors and the importance of proper treatment. * Treatment options if indicated were reviewed today. Instructions:    o Do not engage in activities requiring a normal degree of alertness if fatigue is present. o The patient understands that untreated or undertreated sleep apnea could impair judgement and the ability to function normally during the day.  o Call or return if symptoms worsen or persist.          Staci Cardenas MD, FAA  Electronically signed 11/12/20       This note was created using voice recognition software. Despite editing, there may be syntax errors. This note will not be viewable in [address]

## 2020-11-13 ENCOUNTER — DOCUMENTATION ONLY (OUTPATIENT)
Dept: SLEEP MEDICINE | Age: 50
End: 2020-11-13

## 2020-11-14 NOTE — PROGRESS NOTES
Asked patient to call us back to give us insurance telephone number to call insurance to get PA HST.

## 2020-11-24 ENCOUNTER — DOCUMENTATION ONLY (OUTPATIENT)
Dept: SLEEP MEDICINE | Age: 50
End: 2020-11-24

## 2020-12-16 ENCOUNTER — OFFICE VISIT (OUTPATIENT)
Dept: SLEEP MEDICINE | Age: 50
End: 2020-12-16

## 2020-12-16 ENCOUNTER — HOSPITAL ENCOUNTER (OUTPATIENT)
Dept: SLEEP MEDICINE | Age: 50
Discharge: HOME | End: 2020-12-16
Payer: COMMERCIAL

## 2020-12-16 DIAGNOSIS — G47.33 OSA (OBSTRUCTIVE SLEEP APNEA): Primary | ICD-10-CM

## 2020-12-16 PROCEDURE — 95806 SLEEP STUDY UNATT&RESP EFFT: CPT | Performed by: SPECIALIST

## 2020-12-16 NOTE — PROGRESS NOTES
[address] [facility], [address]  Tel.  [phone]  Fax. [address]  [place], [address] [facility]  Tel.  [phone]  Fax. [phone] [address] [place] [facility] Kimberly Almendarez   Tel.  [phone]  Fax. [phone]       S>Clarence Spear is a 48 y.o. male seen today to receive a home sleep testing unit (HST). · Patient was educated on proper hookup and operation of the HST. · Instruction forms and documentation were reviewed and signed. · The patient demonstrated good understanding of the HST.    O>    There were no vitals taken for this visit. A>  No diagnosis found. P>  · General information regarding operations and maintenance of the device was provided. · He was provided information on sleep apnea including coresponding risk factors and the importance of proper treatment. · Follow-up appointment was made to return the HST. He will be contacted once the results have been reviewed. · He was asked to contact our office for any problems regarding his home sleep test study.

## 2020-12-27 ENCOUNTER — TELEPHONE (OUTPATIENT)
Dept: SLEEP MEDICINE | Age: 50
End: 2020-12-27

## 2020-12-27 NOTE — TELEPHONE ENCOUNTER
HSAT performed during 2 consecutive nights. On 12/16/20  AHI normal at 2.4/h with minimal SaO2 of 88%. Snoring during 0.1% of the study. Study labeled as failed due to prolonged absence of P flow data. On 12/17/2020, AHI of 9.6/h associated with minimal SaO2 of 86%. Snoring of 11.3%. At times during the recording events are prominently grouped associated with desaturation; suggestive of REM-related episodes. Impression: Potential REM-related sleep disordered breathing. Given patient's past history of difficulty with CPAP, other treatment options such as oral appliance may be preferable. Otherwise, APAP 5 to 15 cm would be indicated. Sleep technologist: Please review the HSAT results with the patient. Therapy per patient's preference will be initiated.

## 2021-01-08 ENCOUNTER — DOCUMENTATION ONLY (OUTPATIENT)
Dept: SLEEP MEDICINE | Age: 51
End: 2021-01-08

## 2021-01-08 NOTE — PROGRESS NOTES
Faxed new CPAP order & supplies to DME ; patient notified; pt will call back to schedule 1st adherence appt.

## 2021-03-03 ENCOUNTER — TELEPHONE (OUTPATIENT)
Dept: FAMILY MEDICINE CLINIC | Age: 51
End: 2021-03-03

## 2021-03-03 NOTE — TELEPHONE ENCOUNTER
Pt has been scheduled for [place] for Vaccine and will be getting his labs done at same appt . He stated he is ok on meds at this time and does not need refills .     Closed enc

## 2021-03-30 ENCOUNTER — VIRTUAL VISIT (OUTPATIENT)
Dept: FAMILY MEDICINE CLINIC | Age: 51
End: 2021-03-30
Payer: COMMERCIAL

## 2021-03-30 DIAGNOSIS — H53.8 BLURRY VISION, LEFT EYE: ICD-10-CM

## 2021-03-30 DIAGNOSIS — R51.9 CHRONIC NONINTRACTABLE HEADACHE, UNSPECIFIED HEADACHE TYPE: ICD-10-CM

## 2021-03-30 DIAGNOSIS — G89.29 CHRONIC NONINTRACTABLE HEADACHE, UNSPECIFIED HEADACHE TYPE: ICD-10-CM

## 2021-03-30 DIAGNOSIS — Z86.69 HISTORY OF CYST OF BRAIN: Primary | ICD-10-CM

## 2021-03-30 PROCEDURE — 99214 OFFICE O/P EST MOD 30 MIN: CPT | Performed by: FAMILY MEDICINE

## 2021-03-30 NOTE — PROGRESS NOTES
Mio Howe  48 y.o. male  1970  [address] [place] [address]  [phone]   [address]:    Telemedicine Progress Note  Robinson Orozco MD       Encounter Date and Time: March 30, 2021 at 8:01 AM    Consent:  He and/or the health care decision maker is aware that that he may receive a bill for this telephone service, depending on his insurance coverage, and has provided verbal consent to proceed: Yes    Chief Complaint   Patient presents with    Headache     History of Present Illness   Mio Howe is a 48 y.o. male was evaluated by synchronous (real-time) audio-video technology from home, through the [employer] Patient Portal.    Today: Pt reports occasional  HAs for the past 4-5 months. Pt also reports that it feels like pressure behind his eye at times. Does note some blurry vision at times that resolves. HA improves with NSAIDs. Pt reports about 10 years ago when seeing Dr. Juice Del Cid ENT who ordered CT for ruptured ear drum they found cyst on brain that was pushing outward? Pt reports location was left side. Pt did follow up with Neuro for one visit but can not remember the name of who he had seen. Was told that he would need to have it watched every year or two but never followed up. Pt has not seen anyone for this since then. Denies any N/V, or hearing loss       Review of Systems   Review of Systems   Constitutional: Negative for chills and fever. HENT: Negative for ear pain, hearing loss and tinnitus. Eyes: Positive for blurred vision. Negative for photophobia and pain. Respiratory: Negative for shortness of breath. Cardiovascular: Negative for chest pain. Gastrointestinal: Negative for nausea and vomiting. Neurological: Positive for headaches. Negative for dizziness, seizures, loss of consciousness and weakness.        Vitals/Objective:     General: alert, cooperative, no distress   Mental  status: mental status: alert, oriented to person, place, and time, normal mood, behavior, speech, dress, motor activity, and thought processes   Resp: resp: normal effort and no respiratory distress   Neuro: neuro: no gross deficits   Skin: skin: no discoloration or lesions of concern on visible areas   Due to this being a TeleHealth evaluation, many elements of the physical examination are unable to be assessed. Assessment and Plan:       1. History of cyst of brain: unclear of type but appears based on hx concern for malignancy was low will need to repeat CT to monitor for size and possible mass effect causing HAs?  - CT HEAD WO CONT; Future  - REFERRAL TO NEUROLOGY    2. Blurry vision, left eye: as above   - REFERRAL TO OPHTHALMOLOGY  - Strict ED precautions given to the Pt     3. Chronic nonintractable headache, unspecified headache type  - continue NSAIDs as needed and as above   - REFERRAL TO NEUROLOGY    Time spent in direct conversation with the patient to include medical condition(s) discussed, assessment and treatment plan: 30 min        We discussed the expected course, resolution and complications of the diagnosis(es) in detail. Medication risks, benefits, costs, interactions, and alternatives were discussed as indicated. I advised him to contact the office if his condition worsens, changes or fails to improve as anticipated. He expressed understanding with the diagnosis(es) and plan. Patient understands that this encounter was a temporary measure, and the importance of further follow up and examination was emphasized. Patient verbalized understanding. Patient informed to follow up: 1-2 weeks. Electronically Signed: Sharlyne Duverney, MD    Providers location when delivering service (clinic, hospital, home): home     CPT Codes 41193-57366 for Established Patients may apply to this Telehealth Visit. POS code: 18.   Modifier GT      Pursuant to the emergency declaration under the [address] [place], [address] waiver authority and the Coronavirus Preparedness and Response Supplemental Appropriations Act, this Virtual  Visit was conducted, with patient's consent, to reduce the patient's risk of exposure to COVID-19 and provide continuity of care for an established patient.     Services were provided through a video synchronous discussion virtually to substitute for in-person clinic visit.   History   Patients past medical, surgical and family histories were reviewed and updated.      Past Medical History:   Diagnosis Date   • DJD (degenerative joint disease) of cervical spine      Past Surgical History:   Procedure Laterality Date   • HX ORTHOPAEDIC      ankle surgery   • HX PROSTATE SURGERY  2019     Family History   Problem Relation Age of Onset   • Diabetes Mother    • Hypertension Mother    • Diabetes Father    • Hypertension Father      Social History     Socioeconomic History   • Marital status:      Spouse name: Not on file   • Number of children: Not on file   • Years of education: Not on file   • Highest education level: Not on file   Occupational History   • Not on file   Social Needs   • Financial resource strain: Not on file   • Food insecurity     Worry: Not on file     Inability: Not on file   • Transportation needs     Medical: Not on file     Non-medical: Not on file   Tobacco Use   • Smoking status: Former Smoker     Quit date: 2000     Years since quittin.4   • Smokeless tobacco: Never Used   Substance and Sexual Activity   • Alcohol use: Yes     Comment: socially   • Drug use: No   • Sexual activity: Yes   Lifestyle   • Physical activity     Days per week: Not on file     Minutes per session: Not on file   • Stress: Not on file   Relationships   • Social connections     Talks on phone: Not on file     Gets together: Not on file     Attends Buddhism service: Not on file     Active member of club or organization: Not on file     Attends meetings of clubs or organizations: Not on  file     Relationship status: Not on file    Intimate partner violence     Fear of current or ex partner: Not on file     Emotionally abused: Not on file     Physically abused: Not on file     Forced sexual activity: Not on file   Other Topics Concern    Not on file   Social History Narrative    Not on file     Patient Active Problem List   Diagnosis Code    HLD (hyperlipidemia) E78.5    Chronic lower back pain M54.5, G89.29    Degenerative disc disease at L5-S1 level M51.36    Major depressive disorder, single episode, moderate (HCC) F32.1    Anxiety F41.9    Hx of colonoscopy with polypectomy Z98.890, Z86.010          Current Medications/Allergies   Medications and Allergies reviewed:    Current Outpatient Medications   Medication Sig Dispense Refill    atorvastatin (LIPITOR) 40 mg tablet Take 1 Tab by mouth daily. 90 Tab 3    traZODone (DESYREL) 150 mg tablet Take 1 Tab by mouth nightly. 90 Tab 1    buPROPion XL (WELLBUTRIN XL) 150 mg tablet TAKE 1 TABLET BY MOUTH EVERY DAY IN THE MORNING 90 Tab 1    pantoprazole (PROTONIX) 40 mg tablet Take 1 Tab by mouth daily. 30 Tab 1    tamsulosin (FLOMAX) 0.4 mg capsule Take 2 Caps by mouth daily. 180 Cap 1    polyethylene glycol (MIRALAX) 17 gram packet Take 1 Packet by mouth daily.  30 Packet 2     No Known Allergies

## 2021-04-02 NOTE — PROGRESS NOTES
[address] Medicine Residency Attending Addendum:  Dr. Bran Hardy MD,  the patient and I were not physically present during this encounter. The resident and I are concurrently monitoring the patient care through appropriate telecommunication technology. I discussed the findings, assessment and plan with the resident and agree with the resident's findings and plan as documented in the resident's note.       Emmanuel aMck MD

## 2021-04-08 ENCOUNTER — HOSPITAL ENCOUNTER (OUTPATIENT)
Dept: CT IMAGING | Age: 51
Discharge: HOME | End: 2021-04-08
Attending: FAMILY MEDICINE
Payer: COMMERCIAL

## 2021-04-08 DIAGNOSIS — Z86.69 HISTORY OF CYST OF BRAIN: ICD-10-CM

## 2021-04-08 PROCEDURE — 70450 CT HEAD/BRAIN W/O DYE: CPT

## 2021-04-13 ENCOUNTER — TELEPHONE (OUTPATIENT)
Dept: FAMILY MEDICINE CLINIC | Age: 51
End: 2021-04-13

## 2021-04-13 DIAGNOSIS — E78.49 OTHER HYPERLIPIDEMIA: Primary | ICD-10-CM

## 2021-04-13 NOTE — TELEPHONE ENCOUNTER
Please schedule an office visit to discuss shortness of breath. Labs ordered 10/21/2020. Please schedule a lab appointment.

## 2021-04-13 NOTE — TELEPHONE ENCOUNTER
----- Message from Frida Simmons sent at 4/13/2021  9:11 AM EDT -----  Regarding: Dr. Tha Dooley: [phone]  General Message/Vendor Calls    Caller's first and last name: N/A      Reason for call: Concern about cholesterol       Callback required yes/no and why: Yes      Best contact number(s): [phone]      Details to clarify the request: Pt thinks he may need to do lab work again his cholesterol may be getting high. . He experience some SOB and just feeling tired when he walks up the steps.        Tiara L Toussaint

## 2021-04-13 NOTE — TELEPHONE ENCOUNTER
Patient stated he gets short of breath going up a hill, patient will call back to schedule a lab appointment.

## 2021-04-14 ENCOUNTER — TELEPHONE (OUTPATIENT)
Dept: NEUROLOGY | Age: 51
End: 2021-04-14

## 2021-04-14 ENCOUNTER — TELEPHONE (OUTPATIENT)
Dept: FAMILY MEDICINE CLINIC | Age: 51
End: 2021-04-14

## 2021-04-14 NOTE — TELEPHONE ENCOUNTER
Spoke with patient. He only wanted to schedule for lab work and the shingles shot for the date of 4/29-30. Patient was advised the lab orders will no longer be valid after 4/21 as will be 6 months old and the lab cannot accept. Informed the patient that a message would be sent out for review if NEW ORDER would be put into the chart. Informed the office would contact him after message was reviewed by provider. He also was advised the appt would be on 4/29 which is a [facility] 20. Dr. Gordon Díaz--------  Received: Today  [facility] [address].             Appointment not available     Caller's first and last name and relationship to patient (if not the patient):  N/A     Best contact number:  [phone]     Preferred date and time:  Friday, April 30th, first thing in the morning     Scheduled appointment date and time:  N/A     Reason for appointment:  Labs and shingles injection     Details to clarify the request: Monica Galaviz is requesting a call back to schedule a lab appointment for a complete blood panel and to get his second shingles injection. Oneyda Nieto is giving permission to go ahead and schedule him for Friday, April 30th, in the morning and leave the details of the appointment on his voicemail. Oneyda Nieto cannot always answer his phone, and then we will have to keep sending messages instead.      [facility]

## 2021-04-14 NOTE — TELEPHONE ENCOUNTER
CC message sent to  and Eugenia Draper in the referral order for Neuro to get this patient scheduled ASAP    Thanks  Willie Camacho S/PSR  [facility] Referral Coordinator

## 2021-04-14 NOTE — TELEPHONE ENCOUNTER
----- Message from [employer] sent at 4/13/2021  9:05 AM EDT -----  Regarding: Dr. Rivera/Telephone  Contact: [phone]  General Message/Vendor Calls    Caller's first and last name: N/A      Reason for call: To speak with provider or nurse       Callback required yes/no and why: Yes, emergent       Best contact number(s): [phone]      Details to clarify the request: Pt would like to speak with provider asap, He is still waiting on his next steps. He is in pain, and his headache are constant. The eye doctors dont know what he should do, so he wants to know if he should make an appt with a neurologist. He needs guidance quickly because its unbearable. He needs to plan ahead because he works out of town.        Tiara L Toussaint

## 2021-04-14 NOTE — TELEPHONE ENCOUNTER
S/w pt to schedule NP appt for neurology 4/15/21. States he works out of town a lot and would not be able to come in until 4/30/21.   appt scheduled for 4/30/21 arrival 8:45 am with Dr. Preito Light

## 2021-04-15 NOTE — TELEPHONE ENCOUNTER
Per nurse [address] [facility], Dr. Flaca Pitts put in new lab orders. Left voice mail for patient. (schedule in office doctor appt for:  shortness of breath,  2nd shingles vaccine,  Lab work.

## 2021-04-16 NOTE — TELEPHONE ENCOUNTER
Left 2nd voice mail to schedule in office doctor's appt. View down the messages in this encounter for the reasons.

## 2021-04-30 ENCOUNTER — CLINICAL SUPPORT (OUTPATIENT)
Dept: FAMILY MEDICINE CLINIC | Age: 51
End: 2021-04-30
Payer: COMMERCIAL

## 2021-04-30 ENCOUNTER — LAB ONLY (OUTPATIENT)
Dept: FAMILY MEDICINE CLINIC | Age: 51
End: 2021-04-30

## 2021-04-30 VITALS
HEART RATE: 79 BPM | OXYGEN SATURATION: 97 % | HEIGHT: 68 IN | SYSTOLIC BLOOD PRESSURE: 124 MMHG | RESPIRATION RATE: 16 BRPM | TEMPERATURE: 97.2 F | BODY MASS INDEX: 32.58 KG/M2 | DIASTOLIC BLOOD PRESSURE: 82 MMHG | WEIGHT: 215 LBS

## 2021-04-30 DIAGNOSIS — E78.49 OTHER HYPERLIPIDEMIA: ICD-10-CM

## 2021-04-30 DIAGNOSIS — Z23 ENCOUNTER FOR IMMUNIZATION: Primary | ICD-10-CM

## 2021-04-30 LAB
ALBUMIN SERPL-MCNC: 4.2 G/DL (ref 3.5–5)
ALBUMIN/GLOB SERPL: 1.2 {RATIO} (ref 1.1–2.2)
ALP SERPL-CCNC: 71 U/L (ref 45–117)
ALT SERPL-CCNC: 75 U/L (ref 12–78)
ANION GAP SERPL CALC-SCNC: 7 MMOL/L (ref 5–15)
AST SERPL-CCNC: 32 U/L (ref 15–37)
BILIRUB SERPL-MCNC: 0.4 MG/DL (ref 0.2–1)
BUN SERPL-MCNC: 24 MG/DL (ref 6–20)
BUN/CREAT SERPL: 17 (ref 12–20)
CALCIUM SERPL-MCNC: 8.9 MG/DL (ref 8.5–10.1)
CHLORIDE SERPL-SCNC: 107 MMOL/L (ref 97–108)
CHOLEST SERPL-MCNC: 263 MG/DL
CO2 SERPL-SCNC: 24 MMOL/L (ref 21–32)
CREAT SERPL-MCNC: 1.41 MG/DL (ref 0.7–1.3)
GLOBULIN SER CALC-MCNC: 3.4 G/DL (ref 2–4)
GLUCOSE SERPL-MCNC: 111 MG/DL (ref 65–100)
HDLC SERPL-MCNC: 38 MG/DL
HDLC SERPL: 6.9 {RATIO} (ref 0–5)
LDLC SERPL CALC-MCNC: ABNORMAL MG/DL (ref 0–100)
LDLC SERPL DIRECT ASSAY-MCNC: 143 MG/DL (ref 0–100)
LIPID PROFILE,FLP: ABNORMAL
POTASSIUM SERPL-SCNC: 4.3 MMOL/L (ref 3.5–5.1)
PROT SERPL-MCNC: 7.6 G/DL (ref 6.4–8.2)
SODIUM SERPL-SCNC: 138 MMOL/L (ref 136–145)
TRIGL SERPL-MCNC: 570 MG/DL (ref ?–150)
VLDLC SERPL CALC-MCNC: ABNORMAL MG/DL

## 2021-04-30 PROCEDURE — 90750 HZV VACC RECOMBINANT IM: CPT | Performed by: FAMILY MEDICINE

## 2021-04-30 PROCEDURE — 90471 IMMUNIZATION ADMIN: CPT | Performed by: FAMILY MEDICINE

## 2021-04-30 NOTE — PROGRESS NOTES
Chief Complaint   Patient presents with    Immunization/Injection     2nd shingles vaccine per dr. Tracie Brooks     After obtaining informed consent, the immunization is given by raissa duenas lpn.

## 2021-05-04 ENCOUNTER — TELEPHONE (OUTPATIENT)
Dept: FAMILY MEDICINE CLINIC | Age: 51
End: 2021-05-04

## 2021-05-04 NOTE — PROGRESS NOTES
CMP stable with minimally elevated creatinine. Lipid panel with improved LDL to 143, however TG are significantly elevated. Will discuss maximizing the dose of statin to Lipitor 80mg and recheck lipid in 6 month. Please see the telephone encounter.

## 2021-05-04 NOTE — TELEPHONE ENCOUNTER
I called the patient at [phone] to discuss the results. The patient informed that he is taking his Lipitor inconsistently, only couple times a week. Encouraged to take it daily. Will not increase the dose since the patient is not compliant with current dose. Will need to recheck lipid panel in 3-6 months. The patient reports trouble with staying asleep despite of using Trazodone. He was diagnosed with REJI but is not able to afford CPAP machine due to high insurance deductible. He wants to discuss the issue with . I advised him to take Melatonin every night and call the sleep center and ask if they can help with the companied who may have cheaper options for Cpap machine.    4:38 PM  5/4/2021  José Cummings MD

## 2021-05-28 ENCOUNTER — OFFICE VISIT (OUTPATIENT)
Dept: NEUROLOGY | Age: 51
End: 2021-05-28
Payer: COMMERCIAL

## 2021-05-28 VITALS
SYSTOLIC BLOOD PRESSURE: 136 MMHG | BODY MASS INDEX: 32.61 KG/M2 | OXYGEN SATURATION: 98 % | HEART RATE: 71 BPM | WEIGHT: 214.5 LBS | DIASTOLIC BLOOD PRESSURE: 92 MMHG

## 2021-05-28 DIAGNOSIS — M54.81 OCCIPITAL NEURALGIA OF LEFT SIDE: ICD-10-CM

## 2021-05-28 DIAGNOSIS — G44.86 CERVICOGENIC HEADACHE: Primary | ICD-10-CM

## 2021-05-28 DIAGNOSIS — G47.33 OSA (OBSTRUCTIVE SLEEP APNEA): ICD-10-CM

## 2021-05-28 DIAGNOSIS — G47.52 REM SLEEP BEHAVIOR DISORDER: ICD-10-CM

## 2021-05-28 DIAGNOSIS — G93.0 INTRACRANIAL ARACHNOID CYST: ICD-10-CM

## 2021-05-28 PROCEDURE — 99244 OFF/OP CNSLTJ NEW/EST MOD 40: CPT | Performed by: PSYCHIATRY & NEUROLOGY

## 2021-05-28 RX ORDER — CYCLOBENZAPRINE HCL 10 MG
10 TABLET ORAL
Qty: 30 TABLET | Refills: 2 | Status: SHIPPED | OUTPATIENT
Start: 2021-05-28 | End: 2021-07-29

## 2021-05-28 RX ORDER — BUTALBITAL, ACETAMINOPHEN AND CAFFEINE 50; 325; 40 MG/1; MG/1; MG/1
1 TABLET ORAL
Qty: 40 TABLET | Refills: 0 | Status: SHIPPED | OUTPATIENT
Start: 2021-05-28 | End: 2021-09-23 | Stop reason: SDUPTHER

## 2021-05-28 NOTE — PATIENT INSTRUCTIONS
Neck: Exercises  Introduction  Here are some examples of exercises for you to try. The exercises may be suggested for a condition or for rehabilitation. Start each exercise slowly. Ease off the exercises if you start to have pain. You will be told when to start these exercises and which ones will work best for you. How to do the exercises  Neck stretch   1. This stretch works best if you keep your shoulder down as you lean away from it. To help you remember to do this, start by relaxing your shoulders and lightly holding on to your thighs or your chair. 2. Tilt your head toward your shoulder and hold for 15 to 30 seconds. Let the weight of your head stretch your muscles. 3. If you would like a little added stretch, use your hand to gently and steadily pull your head toward your shoulder. For example, keeping your right shoulder down, lean your head to the left. 4. Repeat 2 to 4 times toward each shoulder. Diagonal neck stretch   1. Turn your head slightly toward the direction you will be stretching, and tilt your head diagonally toward your chest and hold for 15 to 30 seconds. 2. If you would like a little added stretch, use your hand to gently and steadily pull your head forward on the diagonal.  3. Repeat 2 to 4 times toward each side. Dorsal glide stretch   The dorsal glide stretches the back of the neck. If you feel pain, do not glide so far back. Some people find this exercise easier to do while lying on their backs with an ice pack on the neck. 1. Sit or stand tall and look straight ahead. 2. Slowly tuck your chin as you glide your head backward over your body  3. Hold for a count of 6, and then relax for up to 10 seconds. 4. Repeat 8 to 12 times. Chest and shoulder stretch   1. Sit or stand tall and glide your head backward as in the dorsal glide stretch. 2. Raise both arms so that your hands are next to your ears.   3. Take a deep breath, and as you breathe out, lower your elbows down and behind your back. You will feel your shoulder blades slide down and together, and at the same time you will feel a stretch across your chest and the front of your shoulders. 4. Hold for about 6 seconds, and then relax for up to 10 seconds. 5. Repeat 8 to 12 times. Strengthening: Hands on head   1. Move your head backward, forward, and side to side against gentle pressure from your hands, holding each position for about 6 seconds. 2. Repeat 8 to 12 times. Follow-up care is a key part of your treatment and safety. Be sure to make and go to all appointments, and call your doctor if you are having problems. It's also a good idea to know your test results and keep a list of the medicines you take. Where can you learn more? Go to http://www.moon.com/  Enter P975 in the search box to learn more about \"Neck: Exercises. \"  Current as of: November 16, 2020               Content Version: 12.8  © 2006-2021 [employer]. Care instructions adapted under license by [employer] (which disclaims liability or warranty for this information). If you have questions about a medical condition or this instruction, always ask your healthcare professional. [place] [address] any warranty or liability for your use of this information. Shoulder Blade: Exercises  Introduction  Here are some examples of exercises for you to try. The exercises may be suggested for a condition or for rehabilitation. Start each exercise slowly. Ease off the exercises if you start to have pain. You will be told when to start these exercises and which ones will work best for you. How to do the exercises  Shoulder roll   1. Stand tall with your chin slightly tucked. Imagine that a string at the top of your head is pulling you straight up. 2. Keep your arms relaxed. All motion will be in your shoulders. 3. Shrug your shoulders up toward your ears, then up and back.  [place] your shoulders down and back, like you're sliding your hands down into your back pants pockets. 4. Repeat the circles at least 2 to 4 times. 5. This exercise is also helpful anytime you want to relax. Lower neck and upper back stretch   1. With your arms about shoulder height, clasp your hands in front of you. 2. Drop your chin toward your chest.  3. Reach straight forward so you are rounding your upper back. Think about pulling your shoulder blades apart. Shaun Livings feel a stretch across your upper back and shoulders. Hold for at least 6 seconds. 4. Repeat 2 to 4 times. Triceps stretch   1. Reach your arm straight up. 2. Keeping your elbow in place, bend your arm and reach your hand down behind your back. 3. With your other hand, apply gentle pressure to the bent elbow. Shaun Livings feel a stretch at the back of your upper arm and shoulder. Hold about 6 seconds. 4. Repeat 2 to 4 times with each arm. Shoulder stretch   1. Relax your shoulders. 2. Raise one arm to shoulder height, and reach it across your chest.  3. Pull the arm slightly toward you with your other arm. This will help you get a gentle stretch. Hold for about 6 seconds. 4. Repeat 2 to 4 times. Shoulder blade squeeze   1. Sit or stand up tall with your arms at your sides. 2. Keep your shoulders relaxed and down, not shrugged. 3. Squeeze your shoulder blades together. Hold for 6 seconds, then relax. 4. Repeat 8 to 12 times. Straight-arm shoulder blade squeeze   1. Sit or stand tall. Relax your shoulders. 2. With palms down, hold your elastic tubing or band straight out in front of you. 3. Start with slight tension in the tubing or band, with your hands about shoulder-width apart. 4. Slowly pull straight out to the sides, squeezing your shoulder blades together. Keep your arms straight and at shoulder height. Slowly release. 5. Repeat 8 to 12 times. Rowing   1. [place] your elastic tubing or band at about waist height.  Take one end in each hand. 2. Sit or stand with your feet hip-width apart. 3. Hold your arms straight in front of you. Adjust your distance to create slight tension in the tubing or band. 4. Slightly tuck your chin. Relax your shoulders. 5. Without shrugging your shoulders, pull straight back. Your elbows will pass alongside your waist.    Pull-downs   1. [place] your elastic tubing or band in the top of a closed door. Take one end in each hand. 2. Either sit or stand, depending on what is more comfortable. If you feel unsteady, sit on a chair. 3. Start with your arms up and comfortably apart, elbows straight. There should be a slight tension in the tubing or band. 4. Slightly tuck your chin, and look straight ahead. 5. Keeping your back straight, slowly pull down and back, bending your elbows. 6. Stop where your hands are level with your chin, in a \"goalpost\" position. 7. Repeat 8 to 12 times. Chest T stretch   1. Lie on your back. Raise your knees so they are bent. Plant your feet on the floor, hip-width apart. 2. Tuck your chin, and relax your shoulders. 3. Reach your arms straight out to the sides. If you don't feel a mild stretch in your shoulders and across your chest, use a foam roll or a tightly rolled blanket under your spine, from your tailbone to your head. 4. Relax in this position for at least 15 to 30 seconds while you breathe normally. Repeat 2 to 4 times. 5. As you get used to this stretch, keep adding a little more time until you are able relax in this position for 2 or 3 minutes. When you can relax for at least 2 minutes, you only need to do the exercise 1 time per session. Chest goalpost stretch   1. Lie on your back. Raise your knees so they are bent. Plant your feet on the floor, hip-width apart. 2. Tuck your chin, and relax your shoulders. 3. Reach your arms straight out to the sides. 4. Bend your arms at the elbows, with your hands pointed toward the top of your head.  Your arms should make an L on either side of your head. Your palms should be facing up. 5. If you don't feel a mild stretch in your shoulders and across your chest, use a foam roll or tightly rolled blanket under your spine, from your tailbone to your head. 6. Relax in this position for at least 15 to 30 seconds while you breathe normally. Repeat 2 to 4 times. 7. Each day you do this exercise, add a little more time until you can relax in this position for 2 or 3 minutes. When you can relax for at least 2 minutes, you only need to do the exercise 1 time per session. Follow-up care is a key part of your treatment and safety. Be sure to make and go to all appointments, and call your doctor if you are having problems. It's also a good idea to know your test results and keep a list of the medicines you take. Where can you learn more? Go to http://www.gray.com/  Enter H745 in the search box to learn more about \"Shoulder Blade: Exercises. \"  Current as of: November 16, 2020               Content Version: 12.8  © 0222-5760 [employer]. Care instructions adapted under license by [employer] (which disclaims liability or warranty for this information). If you have questions about a medical condition or this instruction, always ask your healthcare professional. [place] [address] any warranty or liability for your use of this information. Sleep Apnea: Care Instructions  Overview     Sleep apnea means that you frequently stop breathing for 10 seconds or longer during sleep. It can be mild to severe, based on the number of times an hour that you stop breathing or have slowed breathing. Blocked or narrowed airways in your nose, mouth, or throat can cause sleep apnea. Your airway can become blocked when your throat muscles and tongue relax during sleep. You can help treat sleep apnea at home by making lifestyle changes.  You also can use a CPAP breathing machine that keeps tissues in the throat from blocking your airway. Or your doctor may suggest that you use a breathing device while you sleep. It helps keep your airway open. This could be a device that you put in your mouth. In some cases, surgery may be needed to remove enlarged tissues in the throat. Follow-up care is a key part of your treatment and safety. Be sure to make and go to all appointments, and call your doctor if you are having problems. It's also a good idea to know your test results and keep a list of the medicines you take. How can you care for yourself at home? · Lose weight, if needed. · Sleep on your side. It may help mild apnea. · Avoid alcohol and medicines such as sleeping pills, opioids, or sedatives before bed. · Don't smoke. If you need help quitting, talk to your doctor. · Prop up the head of your bed. · Treat breathing problems, such as a stuffy nose, that are caused by a cold or allergies. · Try a continuous positive airway pressure (CPAP) breathing machine if your doctor recommends it. · If CPAP doesn't work for you, ask your doctor if you can try other masks, settings, or breathing machines. · Try oral breathing devices or other nasal devices. · Talk to your doctor if your nose feels dry or bleeds, or if it gets runny or stuffy when you use a breathing machine. · Tell your doctor if you're sleepy during the day and it affects your daily life. Don't drive or operate machinery when you're drowsy. When should you call for help? Watch closely for changes in your health, and be sure to contact your doctor if:    · You still have sleep apnea even though you have made lifestyle changes.     · You are thinking of trying a device such as CPAP.     · You are having problems using a CPAP or similar machine.     · You are still sleepy during the day, and it affects your daily life. Where can you learn more?   Go to http://mary-clara.info/  Enter [id number] in the search box to learn more about \"Sleep Apnea: Care Instructions. \"  Current as of: October 26, 2020               Content Version: 12.8  © 2006-2021 Healthwise, [facility]. Care instructions adapted under license by [employer] (which disclaims liability or warranty for this information). If you have questions about a medical condition or this instruction, always ask your healthcare professional. [place] [address] any warranty or liability for your use of this information.

## 2021-05-28 NOTE — LETTER
5/29/2021 Patient: Cleta Spurling YOB: 1970 Date of Visit: 5/28/2021 Suze Reed [id number] [employer] Dirk Marshall MD 
Dominick Batessus [phone] [id number] [employer] Dear MD Dirk Reed MD, Thank you for referring Mr. Theresa Perez to [facility] for evaluation. My notes for this consultation are attached. If you have questions, please do not hesitate to call me. I look forward to following your patient along with you. Sincerely, Evelina Conner MD

## 2021-05-28 NOTE — PROGRESS NOTES
NEUROLOGY CLINIC NOTE    Patient ID:  Brittaney Rose  [id number]  48 y.o.  1970    Date of Consultation:  May 28, 2021    Referring Physician: Dr. Dutch Garcia    Reason for Consultation:  Headache, arachnoid cyst    CC: headache    History of Present Illness:     Patient Active Problem List    Diagnosis Date Noted    Hx of colonoscopy with polypectomy 2018    Major depressive disorder, single episode, moderate ([address] 2017    Anxiety 2017    Chronic lower back pain 2016    Degenerative disc disease at L5-S1 level 2016    HLD (hyperlipidemia) 2015     Past Medical History:   Diagnosis Date    DJD (degenerative joint disease) of cervical spine       Past Surgical History:   Procedure Laterality Date    HX ORTHOPAEDIC  1994    ankle surgery    HX PROSTATE SURGERY  2019      Prior to Admission medications    Medication Sig Start Date End Date Taking? Authorizing Provider   atorvastatin (LIPITOR) 40 mg tablet Take 1 Tab by mouth daily. 10/21/20  Yes Dayton Harada, MD   traZODone (DESYREL) 150 mg tablet Take 1 Tab by mouth nightly. 10/19/20  Yes Dayton Harada, MD   buPROPion XL (WELLBUTRIN XL) 150 mg tablet TAKE 1 TABLET BY MOUTH EVERY DAY IN THE MORNING  Patient not taking: Reported on 2021 10/19/20   Dayton Harada, MD   pantoprazole (PROTONIX) 40 mg tablet Take 1 Tab by mouth daily. Patient not taking: Reported on 2021   Beverly Alexander MD   tamsulosin [facility]) 0.4 mg capsule Take 2 Caps by mouth daily. 19   Dayton Harada, MD   polyethylene glycol (MIRALAX) 17 gram packet Take 1 Packet by mouth daily.   Patient not taking: Reported on 2021   Darrian Schilling MD     No Known Allergies   Social History     Tobacco Use    Smoking status: Former Smoker     Quit date: 2000     Years since quittin.5    Smokeless tobacco: Never Used   Substance Use Topics    Alcohol use: Yes     Comment: socially      Family History   Problem Relation Age of Onset    Diabetes Mother     Hypertension Mother     Diabetes Father     Hypertension Father         Subjective:      Celi Marshall is a 48 y.o. obese LHWM with history of anxiety, depression, lumbar disc disease and arachnoid cyst right frontal convexity who was referred here by Dr. Gina Castanon for further evaluation of his headaches and arachnoid cyst.     Worse the past several months. No prior significant headaches. More frequent and intense  Gradual onset  Left top and frontal and split his head and behind the eye  Throb, achy and sharp pain  3 to at its worst a 7/10  Associated with some blurring vision left eye  Last a few hours or half a day. Occurs 4 times a week. Twice that it is a 7/10. No known precipitant. No worsening factors. Rest may help. Tylenol sometimes offers relief. Trazodone for sleep. Benefit has waned. Problems staying asleep. A lot of dreaming. Nightmares. Stuff from the past. Wakes up not feeling rested. Admits to daytime sleepiness. Snores. Patient has been diagnosed with REJI with AHI of 9.6. Also though to have REM sleep disorder by sleep specialist. Cost of CPAP machine is expensive and was thinking of trying mouth appliance instead. Review of records revealed:  Patient was seen by his PCP via virtual visit last 3/30/2021. Note mentions occasional headaches for the past 4 to 5 months. Feels pressure behind his eyes at times. Some blurring of vision that resolves. Headaches improved with nonsteroidals. Mentions being seen by ENT 10 years prior and had a CT scan done and found a cyst in his brain. Seen by neuro for 1 time and was told that he needed reevaluation every 1 to 2 years. Head CT without contrast was ordered. Referred here to neurology. Patient also referred to ophthalmology. Head CT without contrast done 4/8/2021 revealed right frontal arachnoid cyst which is slightly smaller.   No change from the prior exam.  No mass-effect. Brain MRI with and without contrast done 12/15/2010 revealed arachnoid cyst over the right frontal convexity 42 mm maximum dimension. No abnormal enhancement. No change since 2008. Ventricles are midline without hydrocephalus. Labs done 4/30/2021 revealed CMP showing slightly increased creatinine and decreased GFR. Highly elevated triglycerides and elevated cholesterol. Elevated LDL of 143. Outside reports reviewed: office notes, radiology reports, lab reports. Review of Systems:    A comprehensive review of systems was performed:   Constitutional: positive for fatigue  Eyes: positive for vision problems  Ears, nose, mouth, throat, and face: positive for hearing problems  Respiratory: positive for shortness of breath  Cardiovascular: positive for chest pain, leg swelling  Gastrointestinal: positive for none  Genitourinary: positive for none  Integument/breast: positive for none  Hematologic/lymphatic: positive for none  Musculoskeletal: positive for none  Neurological: positive for headache  Behvioral/Psych: positive for none  Endocrine: positive for none  Allergic/Immunologic: positive for none      Objective:     Visit Vitals  BP (!) 136/92   Pulse 71   Wt 97.3 kg (214 lb 8 oz)   SpO2 98%   BMI 32.61 kg/m²     PHYSICAL EXAM:    General Appearance: Alert, patient appears stated age. General:  Obese, patient in no apparent distress. Head/Face: The head is normocephalic and atraumatic. Eyes: Conjunctivae appear normal. Sclera appear normal and non-icteric. Nose (and Sinus):   No abnormality of the nose or sinuses is noted. Oral:   Throat is clear. Lymphatics:  No lymphadenopathy in the neck/head. Neck and Thyroid:   No bruits noted in the neck. Respiratory:  Lungs clear to auscultation. Cardiovascular:  Palpation and auscultation: regular rate and rhythm. Extremity: No joint swelling, erythema or pedal edema.       NEUROLOGICAL EXAM:    Appearance: The patient is obese, provides a coherent history and is in no acute distress. Mental Status: Oriented to time, place and person. Fluent, no aphasia or dysarthria. Mood and affect appropriate. Cranial Nerves:   Intact visual fields. VA 20/20 OU on near vision without correction. Fundi are benign. GABI, EOM's full, no nystagmus, no ptosis. Facial sensation is normal. Corneal reflexes are intact. Facial movement is symmetric. Hearing is normal bilaterally. Palate is midline with normal elevation. Sternocleidomastoid and trapezius muscles are normal. Tongue is midline. Motor:  5/5 strength in upper and lower proximal and distal muscles. Normal bulk and tone. No fasciculations. No pronator drift. Reflexes:   Deep tendon reflexes 2+/4 and symmetrical. Downgoing toes. Sensory:   Normal to cold, pinprick and vibration. Gait:  Normal gait. No Romberg. Can do tandem walking. Tremor:   No tremor noted. Cerebellar:  Intact FTN/FRANCK/HTS. Neurovascular:  Normal heart sounds and regular rhythm, peripheral pulses intact, and no carotid bruits. Anterior head posture with shoulders rotated. Imaging  CT Head, brain MRI: reviewed    Lab Review      Assessment:   Cervicogenic headache  Left occipital neuralgia  REM sleep behavior disorder  Obstructive sleep apnea  Intracranial arachnoid cyst    Plan:   Neurological examination is nonfocal.  History and exam reveals elements of cervicogenic headaches due to poor anterior head posture. Patient was advised to correct his head posture. Use headrest at work, at home and while driving. Do not carry anything on the shoulder. Use wireless headsets. Use only 1 pillow at most when sleeping. Patient given brochure for neck and shoulder exercises to do. Trial of cyclobenzaprine 10 mg at bedtime initially. Further titration will depend upon response and tolerability. Prescription was provided.   Trial of Fioricet for abortive therapy. History and exam also reveals elements of left occipital neuralgia causing the eye pain. Correction of head posture as above. Trial of medication as above. If condition persist patient may benefit from referral to physical therapy for more aggressive manipulation and dry needling. Potential occipital nerve blocks. By description and also by evaluation of the sleep medicine specialist, patient likely has REM sleep behavior disorder. If above medication does not help and he continues to have this issue, potential trial of clonazepam at bedtime. Continue care with sleep specialist.    Patient diagnosed with obstructive sleep apnea. Advised to invest on a CPAP machine and start treatment. Head CT without contrast as well as previous brain MRI with and without contrast was reviewed with the patient. Explained that condition is likely something he was born with rather than acquired. There is no evidence of mass-effect. Condition is not contributing to his current issue. No changes for the past 13 years. All questions and concerns were answered. This note was created using voice recognition software. Despite editing, there may be syntax errors.

## 2021-07-20 NOTE — PROGRESS NOTES
[address] [facility]   Office [phone], Fax [phone]    Subjective:     Chief Complaint   Patient presents with    Follow-up     walking up steps. \"I just don't feel good and I have never felt like this. I'm so tired. I could literally sleep all day. \"  Patient also states he is supposed to use a cpap but it was $1000. Also noticed he is still wheezing. HPI:  Maria Fernanda Wu is a 48 y.o. male that presents for:    Pt has the following complaints for almost a year now:     SOB: Pt can't walk up first set of stairs without getting out of breath. Also reports wheezing and difficulty breathing after walking short flat distances. Recently diagnosed with REJI but couldn't afford CPAP machine at the beginning of the year. Pt uses one pillow to sleep at night. Chest pain: Pt also complaining of L sided soreness on his chest. Constant, nothing makes it better or worse. He say it makes his arm feel heavy. He reports low extremity edema BL at the end of day. Pt quit smoking a year ago, only smoked socially when playing pool. Negative exercise stress test in 2017. Depression: Pt reports scheduling recent counseling appointment to discuss the divorce he went through years ago. Denies SI/HI. Drinks about 3-6 beers 4x weekly while playing pool with friends. Health Maintenance  Health Maintenance Due   Topic Date Due    Hepatitis C Screening  Never done        Specialists:      Past Medical Hx  I personally reviewed. Past Medical History:   Diagnosis Date    DJD (degenerative joint disease) of cervical spine         SocHx   I personally reviewed.   Social History     Tobacco Use    Smoking status: Former Smoker     Quit date: 2000     Years since quittin.7    Smokeless tobacco: Never Used   Vaping Use    Vaping Use: Never used   Substance Use Topics    Alcohol use: Yes     Comment: socially    Drug use: No        Allergies  No Known Allergies Medications  I personally reviewed. Current Outpatient Medications on File Prior to Visit   Medication Sig Dispense Refill    atorvastatin (LIPITOR) 40 mg tablet Take 1 Tab by mouth daily. 90 Tab 3    traZODone (DESYREL) 150 mg tablet Take 1 Tab by mouth nightly. 90 Tab 1    tamsulosin (FLOMAX) 0.4 mg capsule Take 2 Caps by mouth daily. 180 Cap 1    cyclobenzaprine (FLEXERIL) 10 mg tablet Take 1 Tablet by mouth nightly. (Patient not taking: Reported on 7/21/2021) 30 Tablet 2    butalbital-acetaminophen-caffeine (FIORICET, ESGIC) -40 mg per tablet Take 1 Tablet by mouth every six (6) hours as needed for Headache. (Patient not taking: Reported on 7/21/2021) 40 Tablet 0     No current facility-administered medications on file prior to visit. ROS:     Review of Systems   Constitutional: Positive for malaise/fatigue. Negative for chills and fever. HENT: Negative for hearing loss. Eyes: Negative for blurred vision. Respiratory: Positive for shortness of breath and wheezing. Negative for cough. Cardiovascular: Positive for chest pain. Negative for palpitations. Gastrointestinal: Positive for heartburn. Negative for abdominal pain, nausea and vomiting. Genitourinary: Negative for dysuria. Musculoskeletal: Negative for falls. Neurological: Positive for headaches. Psychiatric/Behavioral: Positive for depression. The patient is nervous/anxious. Objective:   Vitals  I personally reviewed. Visit Vitals  /80 (BP 1 Location: Left arm, BP Patient Position: Sitting, BP Cuff Size: Adult long)   Pulse 80   Temp 97.9 °F (36.6 °C) (Temporal)   Resp 16   Ht 5' 8\" (1.727 m)   Wt 228 lb (103.4 kg)   SpO2 100%   BMI 34.67 kg/m²      EKG Results     Procedure 720 Value Units Date/Time    AMB POC EKG ROUTINE W/ 12 Dilshad Clinton & REP [[id number]] Collected: 07/21/21 1009    Order Status: Completed Updated: 07/21/21 1009          Physical Exam    General AO x 3. No distress.  Not diaphoretic. No jaundice. No cyanosis. No pallor. HENT Head Normocephalic and atraumatic. Eyes Conjunctivae pink, no discharge. No scleral icterus. EOMI. Cardio Normal rate, regular rhythm. No murmur, gallop, or friction rub. Reproducible pain on exam with L sided pectoralis major palpation. Pulmonary Effort normal. Breath sounds normal. No respiratory distress. No wheezes, rales, or rhonchi. No Stridor. Abdominal Soft. Bowel sounds normal. No distension. No tenderness.  Deferred. Extremities No edema of lower extremities. No tenderness. Neurological No focal deficits. Skin Skin is warm and dry. No rash noted. No erythema. Psychiatric Mood, affect, and judgment normal.        I personally reviewed the following Pertinent Labs/Studies:   - Prior stress test, CMP, EKG    Assessment:       ICD-10-CM ICD-9-CM    1. Chest pain, unspecified type  R07.9 786.50 AMB POC EKG ROUTINE W/ 12 LEADS, INTER & REP       Plan:     1. Chest pain. Accompanied with SOB w/ stairs, subjective wheezing, subjective LE edema, significant fatigue. POC EKG normal sinus rhythm, unchanged from previous. Ddx: worsening REJI, CHF, COPD, asthma, thyroid pathology. Unlikey acute process due to 1 year in duration.  -Echocardiogram  -BMP, TSH  -Will order referral for pulmonology for pulmonary function testing if echo normal    2. Obstructive sleep apnea. Pt reports being diagnosed 20 years ago. Has old machine from when he was diagnosed but never used it. He was seen again for sleep study in Jan and cost was too high for new machine. Pt will contact sleep center again to reassess cost now that he has likely met his deductible. -Encouraged patient to monitor diet and exercise for weight loss to improve REJI symptoms  -Will refer to pulmonology for pulmonary function testing if echo normal    3. Depression. Stable. Pt will be attending counseling soon. Denies SI/HI. 4. Alcohol. Drinks 3-6 beers 4x weekly.  Counseled patient on cutting back and encouraged that it would help with his REJI symptoms, weight loss, and overall health. Pt was discussed with Dr. Lenny Akins (attending physician). I have reviewed patient medical and social history and medications. I have reviewed pertinent labs results and other data. I have discussed the diagnosis with the patient and the intended plan as seen in the above orders. The patient has received an after-visit summary and questions were answered concerning future plans. I have discussed medication side effects and warnings with the patient as well.     Katelyn Paredes DO  Resident [facility]  07/21/21

## 2021-07-21 ENCOUNTER — OFFICE VISIT (OUTPATIENT)
Dept: FAMILY MEDICINE CLINIC | Age: 51
End: 2021-07-21
Payer: COMMERCIAL

## 2021-07-21 VITALS
SYSTOLIC BLOOD PRESSURE: 132 MMHG | BODY MASS INDEX: 34.56 KG/M2 | OXYGEN SATURATION: 100 % | WEIGHT: 228 LBS | RESPIRATION RATE: 16 BRPM | DIASTOLIC BLOOD PRESSURE: 80 MMHG | HEART RATE: 80 BPM | HEIGHT: 68 IN | TEMPERATURE: 97.9 F

## 2021-07-21 DIAGNOSIS — F32.1 MAJOR DEPRESSIVE DISORDER, SINGLE EPISODE, MODERATE (HCC): ICD-10-CM

## 2021-07-21 DIAGNOSIS — G47.33 OBSTRUCTIVE SLEEP APNEA OF ADULT: ICD-10-CM

## 2021-07-21 DIAGNOSIS — R07.9 CHEST PAIN, UNSPECIFIED TYPE: Primary | ICD-10-CM

## 2021-07-21 LAB
ANION GAP SERPL CALC-SCNC: 10 MMOL/L (ref 5–15)
BUN SERPL-MCNC: 22 MG/DL (ref 6–20)
BUN/CREAT SERPL: 18 (ref 12–20)
CALCIUM SERPL-MCNC: 9.1 MG/DL (ref 8.5–10.1)
CHLORIDE SERPL-SCNC: 106 MMOL/L (ref 97–108)
CO2 SERPL-SCNC: 25 MMOL/L (ref 21–32)
CREAT SERPL-MCNC: 1.22 MG/DL (ref 0.7–1.3)
GLUCOSE SERPL-MCNC: 102 MG/DL (ref 65–100)
POTASSIUM SERPL-SCNC: 4.8 MMOL/L (ref 3.5–5.1)
SODIUM SERPL-SCNC: 141 MMOL/L (ref 136–145)
TSH SERPL DL<=0.05 MIU/L-ACNC: 1.91 UIU/ML (ref 0.36–3.74)

## 2021-07-21 PROCEDURE — 93000 ELECTROCARDIOGRAM COMPLETE: CPT | Performed by: STUDENT IN AN ORGANIZED HEALTH CARE EDUCATION/TRAINING PROGRAM

## 2021-07-21 PROCEDURE — 99214 OFFICE O/P EST MOD 30 MIN: CPT | Performed by: STUDENT IN AN ORGANIZED HEALTH CARE EDUCATION/TRAINING PROGRAM

## 2021-07-21 NOTE — PATIENT INSTRUCTIONS
Thank you for coming in today, as discussed we will order and echocardiogram to look at your heart function. We call you with your lab results. If your symptoms significantly worsen then please go to the nearest ER.  Also as discussed, we will refer you to pulmonology if your echo is normal.

## 2021-07-21 NOTE — PROGRESS NOTES
Chief Complaint   Patient presents with    Follow-up     walking up steps. \"I just don't feel good and I have never felt like this. I'm so tired. I could literally sleep all day. \"  Patient also states he is supposed to use a cpap but it was $1000. Also noticed he is still wheezing. 1. Have you been to the ER, urgent care clinic since your last visit? Hospitalized since your last visit? no    2. Have you seen or consulted any other health care providers outside of the [address] [place] [address] since your last visit? Include any pap smears or colon screening.  no

## 2021-07-22 NOTE — PROGRESS NOTES
Thyroid and BMP both wnl. Spoke to patient via telephone with identity verification x2. Will discuss echocardiogram results that is scheduled for 8/5.

## 2021-07-28 ENCOUNTER — DOCUMENTATION ONLY (OUTPATIENT)
Dept: SLEEP MEDICINE | Age: 51
End: 2021-07-28

## 2021-07-28 NOTE — PROGRESS NOTES
Patient called and said with [facility], patient will have to wait for device as they are out of stock. I told him that we will be faxing to new DME, [facility]. Please write new order as this order has passed 6 months and insurance will require order within 6 months.

## 2021-07-29 ENCOUNTER — TELEPHONE (OUTPATIENT)
Dept: NEUROLOGY | Age: 51
End: 2021-07-29

## 2021-07-29 DIAGNOSIS — G47.52 REM SLEEP BEHAVIOR DISORDER: Primary | ICD-10-CM

## 2021-07-29 RX ORDER — CLONAZEPAM 0.5 MG/1
TABLET ORAL
Qty: 60 TABLET | Refills: 1 | Status: SHIPPED | OUTPATIENT
Start: 2021-07-29 | End: 2021-09-21 | Stop reason: SDUPTHER

## 2021-07-29 NOTE — TELEPHONE ENCOUNTER
----- Message from Katie Garcia sent at 7/29/2021 12:00 PM EDT -----  Regarding: /telephone  General Message/Vendor Calls    Caller's first and last name:n/a      Reason for call:Medication update      Callback required yes/no and why: Yes      Best contact number(s):[phone]      Details to clarify the request: The medicine prescribed is not helping for his sleep so he would like to try the other medication mentioned during his last appt. He is going out of town today and would like to get the medication today if possible, please advise.         Katie Garcia

## 2021-07-30 ENCOUNTER — TELEPHONE (OUTPATIENT)
Dept: SLEEP MEDICINE | Age: 51
End: 2021-07-30

## 2021-07-30 DIAGNOSIS — G47.33 OSA (OBSTRUCTIVE SLEEP APNEA): Primary | ICD-10-CM

## 2021-08-05 ENCOUNTER — HOSPITAL ENCOUNTER (OUTPATIENT)
Dept: NON INVASIVE DIAGNOSTICS | Age: 51
Discharge: HOME | End: 2021-08-05
Attending: STUDENT IN AN ORGANIZED HEALTH CARE EDUCATION/TRAINING PROGRAM | Admitting: STUDENT IN AN ORGANIZED HEALTH CARE EDUCATION/TRAINING PROGRAM
Payer: COMMERCIAL

## 2021-08-05 VITALS
DIASTOLIC BLOOD PRESSURE: 95 MMHG | BODY MASS INDEX: 34.56 KG/M2 | HEIGHT: 68 IN | WEIGHT: 228 LBS | SYSTOLIC BLOOD PRESSURE: 156 MMHG

## 2021-08-05 DIAGNOSIS — R07.9 CHEST PAIN, UNSPECIFIED TYPE: ICD-10-CM

## 2021-08-05 LAB
ECHO AO ASC DIAM: 3.07 CM
ECHO AO ROOT DIAM: 3.63 CM
ECHO AV AREA PEAK VELOCITY: 2.85 CM2
ECHO AV AREA VTI: 2.76 CM2
ECHO AV AREA/BSA PEAK VELOCITY: 1.3 CM2/M2
ECHO AV AREA/BSA VTI: 1.3 CM2/M2
ECHO AV MEAN GRADIENT: 4.16 MMHG
ECHO AV PEAK GRADIENT: 7.32 MMHG
ECHO AV PEAK VELOCITY: 135.23 CM/S
ECHO AV VTI: 27.65 CM
ECHO LA AREA 4C: 17.17 CM2
ECHO LA MAJOR AXIS: 4.11 CM
ECHO LA MINOR AXIS: 1.9 CM
ECHO LA VOL 2C: 46.1 ML (ref 18–58)
ECHO LA VOL 4C: 43.29 ML (ref 18–58)
ECHO LA VOL BP: 47.59 ML (ref 18–58)
ECHO LA VOL/BSA BIPLANE: 22.03 ML/M2 (ref 16–28)
ECHO LA VOLUME INDEX A2C: 21.34 ML/M2 (ref 16–28)
ECHO LA VOLUME INDEX A4C: 20.04 ML/M2 (ref 16–28)
ECHO LV E' LATERAL VELOCITY: 12.8 CENTIMETER/SECOND
ECHO LV E' SEPTAL VELOCITY: 11.01 CENTIMETER/SECOND
ECHO LV INTERNAL DIMENSION DIASTOLIC: 4.72 CM (ref 4.2–5.9)
ECHO LV INTERNAL DIMENSION SYSTOLIC: 2.99 CM
ECHO LV IVSD: 1.07 CM (ref 0.6–1)
ECHO LV MASS 2D: 190 G (ref 88–224)
ECHO LV MASS INDEX 2D: 88 G/M2 (ref 49–115)
ECHO LV POSTERIOR WALL DIASTOLIC: 1.14 CM (ref 0.6–1)
ECHO LVOT DIAM: 2.04 CM
ECHO LVOT PEAK GRADIENT: 5.55 MMHG
ECHO LVOT PEAK VELOCITY: 117.75 CM/S
ECHO LVOT SV: 76.3 ML
ECHO LVOT VTI: 23.33 CM
ECHO MV A VELOCITY: 88.4 CENTIMETER/SECOND
ECHO MV AREA PHT: 3.73 CM2
ECHO MV E DECELERATION TIME (DT): 203.57 MS
ECHO MV E VELOCITY: 100.99 CENTIMETER/SECOND
ECHO MV PRESSURE HALF TIME (PHT): 59.03 MS
ECHO PV MAX VELOCITY: 104.9 CM/S
ECHO PV PEAK INSTANTANEOUS GRADIENT SYSTOLIC: 4.55 MMHG
ECHO RV INTERNAL DIMENSION: 3.94 CM
ECHO RV TAPSE: 2.02 CM (ref 1.5–2)
ECHO TV REGURGITANT MAX VELOCITY: 267.41 CM/S
ECHO TV REGURGITANT PEAK GRADIENT: 28.6 MMHG
LA VOL DISK BP: 45.02 ML (ref 18–58)
LVOT MG: 3.21 MMHG

## 2021-08-05 PROCEDURE — 93306 TTE W/DOPPLER COMPLETE: CPT

## 2021-08-10 ENCOUNTER — TELEPHONE (OUTPATIENT)
Dept: FAMILY MEDICINE CLINIC | Age: 51
End: 2021-08-10

## 2021-08-10 DIAGNOSIS — G47.33 OBSTRUCTIVE SLEEP APNEA OF ADULT: ICD-10-CM

## 2021-08-10 DIAGNOSIS — R07.9 CHEST PAIN, UNSPECIFIED TYPE: Primary | ICD-10-CM

## 2021-08-10 NOTE — TELEPHONE ENCOUNTER
Verified patient identity x2. Discussed ECHO results with patient. Per last visit, if Echo came back unremarkable then referral to pulmonology would be indicated. Patient reports still having difficulty with sleep apnea and inability to get a hold of CPAP machine. He has cut back on drinking since we last talked. He had increased blood pressure at echo appointment and notes it keeps fluctuating. Instructed him to take BP for 2 weeks and make follow up appointment to consider starting HTN medication.

## 2021-08-24 NOTE — PROGRESS NOTES
Received notice from [facility] that patient was set up on 8/19/2021. Left voicemail for patient to call and schedule 6-8 week follow-up.

## 2021-09-09 ENCOUNTER — TELEPHONE (OUTPATIENT)
Dept: FAMILY MEDICINE CLINIC | Age: 51
End: 2021-09-09

## 2021-09-09 NOTE — TELEPHONE ENCOUNTER
Pt called for referral details. Provided below . Also provided phone number to provider.  Pt will call back with date/time of appt      REF94 - REFERRAL TO PULMONARY DISEASE None Anitra Callahan MD 1 1   Diagnosis Information    Diagnosis   G47.33 (ICD-10-CM) - Obstructive sleep apnea of adult   Referral Order    Order   REFERRAL TO PULMONARY DISEASE (Order # [id number]) on 08/10/2021

## 2021-09-21 ENCOUNTER — OFFICE VISIT (OUTPATIENT)
Dept: NEUROLOGY | Age: 51
End: 2021-09-21
Payer: COMMERCIAL

## 2021-09-21 VITALS
DIASTOLIC BLOOD PRESSURE: 90 MMHG | HEART RATE: 84 BPM | OXYGEN SATURATION: 98 % | SYSTOLIC BLOOD PRESSURE: 134 MMHG | RESPIRATION RATE: 20 BRPM

## 2021-09-21 DIAGNOSIS — G44.86 CERVICOGENIC HEADACHE: ICD-10-CM

## 2021-09-21 DIAGNOSIS — G47.52 REM SLEEP BEHAVIOR DISORDER: ICD-10-CM

## 2021-09-21 DIAGNOSIS — G47.33 OSA (OBSTRUCTIVE SLEEP APNEA): ICD-10-CM

## 2021-09-21 DIAGNOSIS — M54.81 OCCIPITAL NEURALGIA OF LEFT SIDE: Primary | ICD-10-CM

## 2021-09-21 DIAGNOSIS — G93.0 INTRACRANIAL ARACHNOID CYST: ICD-10-CM

## 2021-09-21 PROCEDURE — 99214 OFFICE O/P EST MOD 30 MIN: CPT | Performed by: PSYCHIATRY & NEUROLOGY

## 2021-09-21 RX ORDER — GABAPENTIN 100 MG/1
CAPSULE ORAL
Qty: 180 CAPSULE | Refills: 2 | Status: SHIPPED | OUTPATIENT
Start: 2021-09-21 | End: 2022-03-15

## 2021-09-21 RX ORDER — CLONAZEPAM 0.5 MG/1
1 TABLET ORAL
Qty: 60 TABLET | Refills: 2 | Status: SHIPPED | OUTPATIENT
Start: 2021-09-21 | End: 2021-12-30 | Stop reason: SDUPTHER

## 2021-09-21 NOTE — LETTER
9/23/2021    Patient: Anthony Godinez   YOB: 1970   Date of Visit: 9/21/2021     Jorge Valadez, [address] [place] [address]    Dear Jorge Valadez MD,      Thank you for referring Mr. Edd Davies to [facility] for evaluation. My notes for this consultation are attached. If you have questions, please do not hesitate to call me. I look forward to following your patient along with you.       Sincerely,    Afsaneh Wagner MD

## 2021-09-23 RX ORDER — BUTALBITAL, ACETAMINOPHEN AND CAFFEINE 50; 325; 40 MG/1; MG/1; MG/1
1 TABLET ORAL
Qty: 40 TABLET | Refills: 0 | Status: SHIPPED | OUTPATIENT
Start: 2021-09-23 | End: 2022-03-15

## 2021-10-01 ENCOUNTER — OFFICE VISIT (OUTPATIENT)
Dept: SLEEP MEDICINE | Age: 51
End: 2021-10-01
Payer: COMMERCIAL

## 2021-10-01 VITALS
WEIGHT: 228 LBS | TEMPERATURE: 98.3 F | SYSTOLIC BLOOD PRESSURE: 126 MMHG | OXYGEN SATURATION: 95 % | HEIGHT: 68 IN | HEART RATE: 84 BPM | DIASTOLIC BLOOD PRESSURE: 85 MMHG | BODY MASS INDEX: 34.56 KG/M2

## 2021-10-01 DIAGNOSIS — G47.33 OSA (OBSTRUCTIVE SLEEP APNEA): Primary | ICD-10-CM

## 2021-10-01 DIAGNOSIS — G47.52 REM BEHAVIORAL DISORDER: ICD-10-CM

## 2021-10-01 PROCEDURE — 99213 OFFICE O/P EST LOW 20 MIN: CPT | Performed by: SPECIALIST

## 2021-10-01 RX ORDER — FLUTICASONE FUROATE AND VILANTEROL TRIFENATATE 200; 25 UG/1; UG/1
POWDER RESPIRATORY (INHALATION)
COMMUNITY
Start: 2021-09-24

## 2021-10-01 RX ORDER — ALBUTEROL SULFATE 90 UG/1
AEROSOL, METERED RESPIRATORY (INHALATION)
COMMUNITY
Start: 2021-09-24

## 2021-10-01 NOTE — PROGRESS NOTES
[address] [facility], [facility]  Tel.  [phone]  Fax. [phone] [facility], [address] [facility]  Tel.  [phone]  Fax. [phone] [address]   Tel.  [phone]  Fax. [phone]         Chief Complaint       Chief Complaint   Patient presents with    Sleep Problem     1st adh         HPI        Noemy Reece is a 46 y.o. male seen for follow-up. He presented with a history of snoring and daytime fatigue. He notes having [facility]. At that time he was diagnosed with sleep apnea. He was started on CPAP which he may have used for 6 months before discontinuing. He was evaluated with a home  sleep study which demonstrated AHI of 9.6/h associated with minimal SaO2 of 86%. Snoring of 11.3%. At times during the recording events are prominently grouped associated with desaturation; suggestive of REM-related episodes. APAP 5-15 cm initiated. Other treatment options also considered. He has a history of REM behavior disorder treated by Neurology with clonazepam 1 mg at hs. Does not report current problem with active dreaming. Compliance data downloaded and reviewed in detail with the patient today. During the past 30 days, APAP used during 26 days with the average daily use of 6.3 hours. CMS compliance criteria 73%. AHI 2 per hour. No Known Allergies    Current Outpatient Medications   Medication Sig Dispense Refill    albuterol (PROVENTIL HFA, VENTOLIN HFA, PROAIR HFA) 90 mcg/actuation inhaler 2 (TWO) PUFF FOUR TIMES DAILY, AS NEEDED FOR WHEEZING OR SHORTNESS OF BREATH      Breo Ellipta 200-25 mcg/dose inhaler TAKE 1 PUFF BY MOUTH EVERY DAY      gabapentin (NEURONTIN) 100 mg capsule Take 1 caps TID x 1 week; then 2 caps  Capsule 2    clonazePAM (KlonoPIN) 0.5 mg tablet Take 2 Tablets by mouth nightly. Max Daily Amount: 1 mg.  Take 1 at bedtime x 1 week; then 2 at bedtime 60 Tablet 2    traZODone (DESYREL) 150 mg tablet Take 1 Tab by mouth nightly. 90 Tab 1    tamsulosin (FLOMAX) 0.4 mg capsule Take 2 Caps by mouth daily. 180 Cap 1    butalbital-acetaminophen-caffeine (FIORICET, ESGIC) -40 mg per tablet Take 1 Tablet by mouth every six (6) hours as needed for Headache. (Patient not taking: Reported on 10/1/2021) 40 Tablet 0    atorvastatin (LIPITOR) 40 mg tablet Take 1 Tab by mouth daily. (Patient not taking: Reported on 10/1/2021) 90 Tab 3        He  has a past medical history of DJD (degenerative joint disease) of cervical spine. He  has a past surgical history that includes hx orthopaedic (1994) and hx prostate surgery (06/2019). He family history includes Diabetes in his father and mother; Hypertension in his father and mother. He  reports that he quit smoking about 20 years ago. He has never used smokeless tobacco. He reports current alcohol use. He reports that he does not use drugs. Review of Systems:  Unchanged per patient      Objective:     Visit Vitals  /85   Pulse 84   Temp 98.3 °F (36.8 °C)   Ht 5' 8\" (1.727 m)   Wt 228 lb (103.4 kg)   SpO2 95%   BMI 34.67 kg/m²     Body mass index is 34.67 kg/m². General:   Conversant, cooperative   Eyes:  Pupils equal and reactive, no nystagmus   Oropharynx:   Mallampati score IV, tongue scalloped            Chest/Lungs:  Clear on auscultation    CVS:  Normal rate        Neuro:  Speech fluent, face symmetrical             Assessment:       ICD-10-CM ICD-9-CM    1. REJI (obstructive sleep apnea)  G47.33 327.23 AMB SUPPLY ORDER   2. REM behavioral disorder  G47.52 327.42      Sleep disordered breathing responding consistently to APAP 5-15 cm. He will continue with the current pressure settings. Does not currently report significant active dreaming.     he is compliant with PAP therapy and PAP continues to benefit patient and remains necessary for control of his sleep apnea.    Plan:     Orders Placed This Encounter    AMB SUPPLY ORDER     Diagnosis: Obstructive Sleep Apnea ICD-10 Code (G47.33)    CPAP mask and supplies -  Patient preference, headgear, heated tubing, and filter;  heated humidifier. Wireless modem. Remote monitoring enrollment.  Oral/Nasal Combo Mask 1 every 3 months.  Oral Cushion Combo Mask (Replace) 2 per month.  Nasal Pillows Combo Mask (Replace) 2 per month.  Full Face Mask 1 every 3 months.  Full Face Mask Cushion 1 per month.  Nasal Cushion (Replace) 2 per month.  Nasal Pillows (Replace) 2 per month.  Nasal Interface Mask 1 every 3 months.  Headgear 1 every 6 months.  Chinstrap 1 every 6 months.  Tubing 1 every 3 months.  Filter(s) Disposable 2 per month.  Filter(s) Non-Disposable 1 every 6 months.  Oral Interface 1 every 3 months. [address] for [employer] (Replace) 1 every 6 months.  Tubing with heating element 1 every 3 months.                 Cora Leon MD, [facility]  Diplomate, American Board of Sleep Medicine  NPI [id number]  Electronically signed 10/1/21       *A copy of compliance data was provided to the patient and reviewed in detail. *CPAP will be continued at the above pressure settings. The patient is to contact the office if there are problems with either mask or pressure settings. Follow-up will be scheduled at which time compliance data will be reviewed. * Patient has a history and examination consistent with the diagnosis of sleep apnea. * He was provided information on sleep apnea including corresponding risk factors and the importance of proper treatment. * Treatment options if indicated were reviewed today. * Potential benefit of weight reduction was discussed      Cora Leon MD, [facility]  Electronically signed 10/01/21        This note was created using voice recognition software.  Despite editing, there may be syntax errors. This note will not be viewable in [address]

## 2021-10-04 ENCOUNTER — DOCUMENTATION ONLY (OUTPATIENT)
Dept: SLEEP MEDICINE | Age: 51
End: 2021-10-04

## 2021-10-23 DIAGNOSIS — F51.01 PRIMARY INSOMNIA: ICD-10-CM

## 2021-10-25 RX ORDER — TRAZODONE HYDROCHLORIDE 50 MG/1
TABLET ORAL
Qty: 30 TABLET | Refills: 1 | OUTPATIENT
Start: 2021-10-25

## 2021-10-25 NOTE — TELEPHONE ENCOUNTER
Need to clarify dosing of Trazodone. LOV with me in 10/2020 but did follow up with another provider.

## 2021-10-27 NOTE — TELEPHONE ENCOUNTER
Attempted to contact patient to clarify dose as last rx was 10/2020 and it was for 90 days. Also patient has seen other providers in the mean time. Please forward to nurse if call returnd.

## 2021-12-30 DIAGNOSIS — G47.52 REM SLEEP BEHAVIOR DISORDER: ICD-10-CM

## 2021-12-30 RX ORDER — CLONAZEPAM 0.5 MG/1
1 TABLET ORAL
Qty: 60 TABLET | Refills: 0 | Status: SHIPPED | OUTPATIENT
Start: 2021-12-30 | End: 2022-03-08 | Stop reason: SDUPTHER

## 2021-12-30 NOTE — TELEPHONE ENCOUNTER
Requested Prescriptions     Pending Prescriptions Disp Refills    clonazePAM (KlonoPIN) 0.5 mg tablet 60 Tablet 2     Sig: Take 2 Tablets by mouth nightly. Max Daily Amount: 1 mg.  Take 1 at bedtime x 1 week; then 2 at bedtime     Patient has been taking 3 pills because he is still having dreams

## 2022-03-08 ENCOUNTER — TELEPHONE (OUTPATIENT)
Dept: FAMILY MEDICINE CLINIC | Age: 52
End: 2022-03-08

## 2022-03-08 ENCOUNTER — OFFICE VISIT (OUTPATIENT)
Dept: NEUROLOGY | Age: 52
End: 2022-03-08
Payer: COMMERCIAL

## 2022-03-08 VITALS — RESPIRATION RATE: 20 BRPM | SYSTOLIC BLOOD PRESSURE: 136 MMHG | DIASTOLIC BLOOD PRESSURE: 98 MMHG

## 2022-03-08 DIAGNOSIS — G44.86 CERVICOGENIC HEADACHE: ICD-10-CM

## 2022-03-08 DIAGNOSIS — G93.0 INTRACRANIAL ARACHNOID CYST: ICD-10-CM

## 2022-03-08 DIAGNOSIS — G47.33 OSA (OBSTRUCTIVE SLEEP APNEA): ICD-10-CM

## 2022-03-08 DIAGNOSIS — M54.81 OCCIPITAL NEURALGIA OF LEFT SIDE: ICD-10-CM

## 2022-03-08 DIAGNOSIS — G47.52 REM SLEEP BEHAVIOR DISORDER: Primary | ICD-10-CM

## 2022-03-08 PROCEDURE — 99214 OFFICE O/P EST MOD 30 MIN: CPT | Performed by: PSYCHIATRY & NEUROLOGY

## 2022-03-08 RX ORDER — AMOXICILLIN 875 MG/1
875 TABLET, FILM COATED ORAL EVERY 12 HOURS
COMMUNITY
Start: 2022-03-02 | End: 2022-03-15

## 2022-03-08 RX ORDER — CHLORHEXIDINE GLUCONATE 1.2 MG/ML
RINSE ORAL
COMMUNITY
Start: 2022-03-02

## 2022-03-08 RX ORDER — CLONAZEPAM 0.5 MG/1
1 TABLET ORAL
Qty: 60 TABLET | Refills: 3 | Status: SHIPPED | OUTPATIENT
Start: 2022-03-08 | End: 2022-09-13

## 2022-03-08 RX ORDER — PANTOPRAZOLE SODIUM 20 MG/1
20 TABLET, DELAYED RELEASE ORAL DAILY
COMMUNITY
Start: 2022-01-26

## 2022-03-08 RX ORDER — METHYLPREDNISOLONE 4 MG/1
TABLET ORAL
COMMUNITY
Start: 2022-03-02 | End: 2022-03-08

## 2022-03-08 RX ORDER — HYDROCODONE BITARTRATE AND ACETAMINOPHEN 5; 325 MG/1; MG/1
TABLET ORAL
COMMUNITY
Start: 2022-02-14 | End: 2022-03-15

## 2022-03-08 NOTE — TELEPHONE ENCOUNTER
Called pt to schedule physical. He was wondering if he needed to fast for the appt. Please Advise. Thank you.

## 2022-03-08 NOTE — LETTER
3/8/2022    Patient: Lorraine Horvath   YOB: 1970   Date of Visit: 3/8/2022     Hiram Rizvi, [address] [place] [address]    Dear Hiram Rizvi MD,      Thank you for referring Mr. Deanne Isaacs to [facility] for evaluation. My notes for this consultation are attached. If you have questions, please do not hesitate to call me. I look forward to following your patient along with you.       Sincerely,    Keren Waldron MD

## 2022-03-08 NOTE — PROGRESS NOTES
NEUROLOGY CLINIC NOTE    Patient ID:  Xavier Roberts  [id number]  85 y.o.  1970    Date of Visit:  March 8, 2022    Referring Physician: Dr. Lopez Presumavelina    Reason for Visit:  Headache, arachnoid cyst    CC: headache    History of Present Illness:     Patient Active Problem List    Diagnosis Date Noted    Hx of colonoscopy with polypectomy 08/29/2018    Major depressive disorder, single episode, moderate ([address] 02/20/2017    Anxiety 02/20/2017    Chronic lower back pain 02/17/2016    Degenerative disc disease at L5-S1 level 02/17/2016    HLD (hyperlipidemia) 11/18/2015     Past Medical History:   Diagnosis Date    DJD (degenerative joint disease) of cervical spine       Past Surgical History:   Procedure Laterality Date    HX ORTHOPAEDIC  1994    ankle surgery    HX PROSTATE SURGERY  06/2019      Prior to Admission medications    Medication Sig Start Date End Date Taking? Authorizing Provider   clonazePAM (KlonoPIN) 0.5 mg tablet Take 2 Tablets by mouth nightly. Max Daily Amount: 1 mg. Take 1 at bedtime x 1 week; then 2 at bedtime 12/30/21   Gibran Franco MD   albuterol (PROVENTIL HFA, VENTOLIN HFA, PROAIR HFA) 90 mcg/actuation inhaler 2 (TWO) PUFF FOUR TIMES DAILY, AS NEEDED FOR WHEEZING OR SHORTNESS OF BREATH 9/24/21   Provider, Historical   Breo Ellipta 200-25 mcg/dose inhaler TAKE 1 PUFF BY MOUTH EVERY DAY 9/24/21   Provider, Historical   butalbital-acetaminophen-caffeine (FIORICET, ESGIC) -40 mg per tablet Take 1 Tablet by mouth every six (6) hours as needed for Headache. Patient not taking: Reported on 10/1/2021 9/23/21   Adam Gleason MD   gabapentin (NEURONTIN) 100 mg capsule Take 1 caps TID x 1 week; then 2 caps TID 9/21/21   Adam Gleason MD   atorvastatin (LIPITOR) 40 mg tablet Take 1 Tab by mouth daily. Patient not taking: Reported on 10/1/2021 10/21/20   Dexter Reyes MD   traZODone (DESYREL) 150 mg tablet Take 1 Tab by mouth nightly.  10/19/20 Katelyn Pimentel MD   tamsulosin (FLOMAX) 0.4 mg capsule Take 2 Caps by mouth daily. 19   Katelyn Pimentel MD     No Known Allergies   Social History     Tobacco Use    Smoking status: Former Smoker     Quit date: 2000     Years since quittin.3    Smokeless tobacco: Never Used   Substance Use Topics    Alcohol use: Yes     Comment: socially      Family History   Problem Relation Age of Onset    Diabetes Mother     Hypertension Mother     Diabetes Father     Hypertension Father         Subjective:      Rimma Talbert is a 46 y.o. obese LHWM with history of anxiety, depression, lumbar disc disease and arachnoid cyst right frontal convexity who was referred here by Dr. Alaina Pope for further evaluation of his headaches and arachnoid cyst. Patient is here for follow up. Worse the past several months. No prior significant headaches. More frequent and intense  Gradual onset  Left top and frontal and split his head and behind the eye  Throb, achy and sharp pain  3 to at its worst a 7/10  Associated with some blurring vision left eye  Last a few hours or half a day. Occurs 4 times a week. Twice that it is a 7/10. No known precipitant. No worsening factors. Rest may help. Tylenol sometimes offers relief. Trazodone for sleep. Benefit has waned. Problems staying asleep. A lot of dreaming. Nightmares. Stuff from the past. Wakes up not feeling rested. Admits to daytime sleepiness. Snores. Patient has been diagnosed with REJI with AHI of 9.6. Also though to have REM sleep disorder by sleep specialist. Cost of CPAP machine is expensive and was thinking of trying mouth appliance instead. Review of records revealed:  Patient was seen by his PCP via virtual visit last 3/30/2021. Note mentions occasional headaches for the past 4 to 5 months. Feels pressure behind his eyes at times. Some blurring of vision that resolves. Headaches improved with nonsteroidals.   Mentions being seen by ENT 10 years prior and had a CT scan done and found a cyst in his brain. Seen by neuro for 1 time and was told that he needed reevaluation every 1 to 2 years. Head CT without contrast was ordered. Referred here to neurology. Patient also referred to ophthalmology. Head CT without contrast done 4/8/2021 revealed right frontal arachnoid cyst which is slightly smaller. No change from the prior exam.  No mass-effect. Brain MRI with and without contrast done 12/15/2010 revealed arachnoid cyst over the right frontal convexity 42 mm maximum dimension. No abnormal enhancement. No change since 2008. Ventricles are midline without hydrocephalus. Labs done 4/30/2021 revealed CMP showing slightly increased creatinine and decreased GFR. Highly elevated triglycerides and elevated cholesterol. Elevated LDL of 143. Since the last visit on 9/21/2021, patient was seen by Dr. Giovanni Burrows (sleep specialist) 10/1/2021 and note mentions compliance with CPAP that is effective and clonazepam 1 mg at bedtime is offering benefit for his REM sleep disorder. Patient apparently injured his left Achilles tendon is currently in a boot. Patient reports that his condition is better. Less headaches. Takes gabapentin 100 mg at bedtime. Only takes clonazepam 0.5 mg at bedtime. Patient continues to sleep better with less dreams and nightmares. Less intense headaches that respond to over-the-counter medication or as needed Fioricet. Patient reports that since the foot injury he has been mainly staying at home which likely has helped his condition.     Outside reports reviewed: office notes    Review of Systems:    A comprehensive review of systems was performed:   Constitutional: positive for fatigue  Eyes: positive for vision problems  Ears, nose, mouth, throat, and face: positive for hearing problems  Respiratory: positive for shortness of breath  Cardiovascular: positive for chest pain, leg swelling  Gastrointestinal: positive for none  Genitourinary: positive for none  Integument/breast: positive for none  Hematologic/lymphatic: positive for none  Musculoskeletal: positive for none  Neurological: positive for headache  Behvioral/Psych: positive for none  Endocrine: positive for none  Allergic/Immunologic: positive for none      Objective:     Visit Vitals  BP (!) 136/98 (BP 1 Location: Right arm, BP Patient Position: Sitting, BP Cuff Size: Adult)   Resp 20         PHYSICAL EXAM:    NEUROLOGICAL EXAM:    Appearance: The patient is obese, provides a coherent history and is in no acute distress. Mental Status: Oriented to time, place and person. Fluent, no aphasia or dysarthria. Mood and affect appropriate. Cranial Nerves:   II - XII were intact. Motor:  5/5 strength in upper and lower proximal and distal muscles. Normal bulk and tone. No pronator drift. Reflexes:   Deep tendon reflexes were symmetrical.    Sensory:   Normal to cold and vibration. Gait:  Normal gait. No Romberg. Tremor:   No tremor noted. Cerebellar:  Intact FTN/FRANCK/HTS. Anterior head posture with shoulders rotated. Assessment:   Left occipital neuralgia  Cervicogenic headache  REM sleep behavior disorder  Obstructive sleep apnea  Intracranial arachnoid cyst    Plan:   Neurological examination is nonfocal.  By description and also by evaluation of the sleep medicine specialist, patient likely has REM sleep behavior disorder. Continue Clonazepam 0.5 mg to 1 mg at bedtime  Prescription was provided. Advised to discuss with sleep specialist for other alternative treatments. [facility] prescription monitoring program was reviewed and patient feels 60 0.5 mg tablets of clonazepam last 12/30/2021. History and exam reveals elements of left occipital neuralgia causing the eye pain. Correction of head posture. Continue Gabapentin 100 mg up to3 times daily if necessary if necessary.   [facility] prescription monitoring program was reviewed and patient last filled 180 capsules of 100 mg gabapentin 10/19/2021. If condition persist patient may benefit from referral to physical therapy for more aggressive manipulation and dry needling. Potential occipital nerve blocks. History and exam reveals elements of cervicogenic headaches due to poor anterior head posture. Patient was advised to correct his head posture. Use headrest at work, at home and while driving. Do not carry anything on the shoulder. Use wireless headsets. Use only 1 pillow at most when sleeping. Continue neck and shoulder exercises previously provided. Continue Fioricet for abortive therapy. Patient diagnosed with obstructive sleep apnea. Encourage continued compliance with CPAP supplementation. Weight loss. Head CT without contrast as well as previous brain MRI with and without contrast was reviewed with the patient. Explained that condition is likely something he was born with rather than acquired. There is no evidence of mass-effect. Condition is not contributing to his current issue. No changes for the past 13 years. All questions and concerns were answered. This note was created using voice recognition software. Despite editing, there may be syntax errors.

## 2022-03-08 NOTE — TELEPHONE ENCOUNTER
Called patient and informed him he does not have to fast for his labs. Patient states understanding.

## 2022-03-15 ENCOUNTER — OFFICE VISIT (OUTPATIENT)
Dept: FAMILY MEDICINE CLINIC | Age: 52
End: 2022-03-15
Payer: COMMERCIAL

## 2022-03-15 VITALS
RESPIRATION RATE: 18 BRPM | BODY MASS INDEX: 33.04 KG/M2 | TEMPERATURE: 98.2 F | DIASTOLIC BLOOD PRESSURE: 83 MMHG | HEIGHT: 68 IN | HEART RATE: 87 BPM | WEIGHT: 218 LBS | OXYGEN SATURATION: 95 % | SYSTOLIC BLOOD PRESSURE: 116 MMHG

## 2022-03-15 DIAGNOSIS — E66.9 OBESITY (BMI 30-39.9): ICD-10-CM

## 2022-03-15 DIAGNOSIS — Z86.010 HX OF COLONOSCOPY WITH POLYPECTOMY: ICD-10-CM

## 2022-03-15 DIAGNOSIS — Z13.9 SCREENING DUE: ICD-10-CM

## 2022-03-15 DIAGNOSIS — Z00.00 ANNUAL PHYSICAL EXAM: ICD-10-CM

## 2022-03-15 DIAGNOSIS — F32.1 MAJOR DEPRESSIVE DISORDER, SINGLE EPISODE, MODERATE (HCC): ICD-10-CM

## 2022-03-15 DIAGNOSIS — E78.49 OTHER HYPERLIPIDEMIA: Primary | ICD-10-CM

## 2022-03-15 DIAGNOSIS — Z98.890 HX OF COLONOSCOPY WITH POLYPECTOMY: ICD-10-CM

## 2022-03-15 DIAGNOSIS — Z78.9 ALCOHOL USE: ICD-10-CM

## 2022-03-15 DIAGNOSIS — R07.89 ANTERIOR CHEST WALL PAIN: ICD-10-CM

## 2022-03-15 PROCEDURE — 99214 OFFICE O/P EST MOD 30 MIN: CPT | Performed by: STUDENT IN AN ORGANIZED HEALTH CARE EDUCATION/TRAINING PROGRAM

## 2022-03-15 PROCEDURE — 99396 PREV VISIT EST AGE 40-64: CPT | Performed by: STUDENT IN AN ORGANIZED HEALTH CARE EDUCATION/TRAINING PROGRAM

## 2022-03-15 RX ORDER — DICLOFENAC SODIUM 10 MG/G
GEL TOPICAL 4 TIMES DAILY
Qty: 1 EACH | Refills: 0 | Status: SHIPPED | OUTPATIENT
Start: 2022-03-15 | End: 2022-04-13

## 2022-03-15 NOTE — PROGRESS NOTES
Ezequiel Etienne is a 46 y.o. male    Chief Complaint   Patient presents with    Complete Physical   Pt has appt w/psychologist.    Visit Vitals  /83   Pulse 87   Temp 98.2 °F (36.8 °C) (Oral)   Resp 18   Ht 5' 8\" (1.727 m)   Wt 218 lb (98.9 kg)   SpO2 95%   BMI 33.15 kg/m²       3 most recent PHQ Screens 3/15/2022   Little interest or pleasure in doing things More than half the days   Feeling down, depressed, irritable, or hopeless More than half the days   Total Score PHQ 2 4   Trouble falling or staying asleep, or sleeping too much Not at all   Feeling tired or having little energy Nearly every day   Poor appetite, weight loss, or overeating Not at all   Feeling bad about yourself - or that you are a failure or have let yourself or your family down More than half the days   Trouble concentrating on things such as school, work, reading, or watching TV More than half the days   Moving or speaking so slowly that other people could have noticed; or the opposite being so fidgety that others notice Not at all   Thoughts of being better off dead, or hurting yourself in some way Not at all   PHQ 9 Score 11   How difficult have these problems made it for you to do your work, take care of your home and get along with others Somewhat difficult       Fall Risk Assessment, last 12 mths 5/22/2019   Able to walk? Yes   Fall in past 12 months? No       Abuse Screening Questionnaire 5/22/2019   Do you ever feel afraid of your partner? N   Are you in a relationship with someone who physically or mentally threatens you? N   Is it safe for you to go home? Y       1. Have you been to the ER, urgent care clinic since your last visit? Hospitalized since your last visit? No     2. Have you seen or consulted any other health care providers outside of the [address] [place] [address] since your last visit? Include any pap smears or colon screening.  No

## 2022-03-15 NOTE — PATIENT INSTRUCTIONS
Thank you for coming in today! We will follow up with your regarding your lab results. 9 Ways to Cut Back on Drinking  Maybe you've found yourself drinking more alcohol than you'd prefer. If you want to cut back, here are some ideas to try. Think before you drink. Do you really want a drink, or is it just a habit? If you're used to having a drink at a certain time, try doing something else then. Look for substitutes. Find some no-alcohol drinks that you enjoy, like flavored seltzer water, tea with honey, or tonic with a slice of lime. Or try alcohol-free beer or \"virgin\" cocktails (without the alcohol). Drink more water. Use water to quench your thirst. Drink a glass of water before you have any alcohol. Have another glass along with every drink or between drinks. Shrink your drink. For example, have a bottle of beer instead of a pint. Use a smaller glass for wine. Choose drinks with lower alcohol content (ABV%). Or use less liquor and more mixer in cocktails. Slow down. It's easy to drink quickly and without thinking about it. Pay attention, and make each drink last longer. Do the math. Total up how much you spend on alcohol each month. How much is that a year? If you cut back, what could you do with the money you save? Take a break. Choose a day or two each week when you won't drink at all. Notice how you feel on those days, physically and emotionally. How did you sleep? Do you feel better? Over time, add more break days. Count calories. Would you like to lose some weight? That can be a good motivator for cutting back. Figure out how many calories are in each drink. How many does that add up to in a day? In a week? In a month? Practice saying no. Be ready when someone offers you a drink. Try: Radha Vaughan, I've had enough. \" Or \"Thanks, but I'm cutting back. \" Or \"No, thanks. I feel better when I drink less. \"   Current as of: November 8, 2021               Content Version: 13.2  © 4222-3901 [employer]wise, Incorporated. Care instructions adapted under license by [employer] (which disclaims liability or warranty for this information). If you have questions about a medical condition or this instruction, always ask your healthcare professional. [address] any warranty or liability for your use of this information.

## 2022-03-15 NOTE — PROGRESS NOTES
[address] [facility] [address] [address]   Office [phone], Fax [phone]    Subjective:     Chief Complaint   Patient presents with    Complete Physical       HPI:  Amada Burkitt is a 46 y.o. male presents for:    Complete physical exam.     Also complaining of:  Chest pain: 4 or 5 days a week he notes this. Describes as tightness, soreness across his L superior chest wall. Doesn't radiate. Nothing makes it better, there all day. Patient has wanted to exercise but can't due to the discomfort. Not currently in pain. Doesn't feel it is cardiac related, as it is just inferior to where he has old collar bone fracture. SOB: Still a problem when walking with stairs. Passed PFTs. Patient echo was wnl. Currently undergoing further work up with Dr. Zahida Osborn. Started him on maintenance inhaler and albuterol prn. Still taking albuterol daily after stairs. Depression: Patient seeing counselor and psychiatrist next week. Has been on wellbutrin previously but reports he did not keep up with it. Patient denies SI. Just reports increase in stress from divorce. Past Medical Hx: I personally reviewed. Past Medical History:   Diagnosis Date    Anxiety 2/20/2017    Depression     DJD (degenerative joint disease) of cervical spine     HLD (hyperlipidemia) 11/18/2015    Hx of colonoscopy with polypectomy 8/29/2018    Colonoscopy completed 8/27/18 Polypectomy x2 (3-4mm descending colon and 5mm sigmoid colon) Tubular adenoma per path report Repeat colonoscopy in 5 years    Hyperlipidemia     Major depressive disorder, single episode, moderate ([facility] [address] 2/20/2017        Specialists:  Dr. Zahida Ash Hx: I personally reviewed. Alcohol: A couple beers a day, trying to cut back  Tobacco: Former   Drug use: Never  Diet: Fast food, eating out  Exercise: Limited due to boot     Medications: I personally reviewed.   Current Outpatient Medications on File Prior to Visit   Medication Sig Dispense Refill    chlorhexidine (PERIDEX) 0.12 % solution       pantoprazole (PROTONIX) 20 mg tablet Take 20 mg by mouth daily.  clonazePAM (KlonoPIN) 0.5 mg tablet Take 2 Tablets by mouth nightly. Max Daily Amount: 1 mg. 60 Tablet 3    albuterol (PROVENTIL HFA, VENTOLIN HFA, PROAIR HFA) 90 mcg/actuation inhaler 2 (TWO) PUFF FOUR TIMES DAILY, AS NEEDED FOR WHEEZING OR SHORTNESS OF BREATH      Breo Ellipta 200-25 mcg/dose inhaler TAKE 1 PUFF BY MOUTH EVERY DAY       No current facility-administered medications on file prior to visit. Allergies: I personally reviewed. No Known Allergies     Health Maintenance:  Health Maintenance Due   Topic Date Due    Flu Vaccine (1) 09/01/2021    COVID-19 Vaccine (3 - Booster for Pfizer series) 11/20/2021        ROS:   Pertinent items are noted in HPI. Objective:   Vitals: I personally reviewed. Visit Vitals  /83   Pulse 87   Temp 98.2 °F (36.8 °C) (Oral)   Resp 18   Ht 5' 8\" (1.727 m)   Wt 218 lb (98.9 kg)   SpO2 95%   BMI 33.15 kg/m²        Physical Exam:  General Alert. No distress. Not diaphoretic. No jaundice, cyanosis, pallor. HENT Head Normocephalic and atraumatic. Eyes Conjunctivae pink, no discharge. No scleral icterus. EOMI. Cardio Normal rate, regular rhythm. No murmur, gallop, or friction rub. No chest wall tenderness. Anterior chest wall tenderness on L side just inferior to clavicle. Pulmonary Effort normal. Breath sounds normal. No respiratory distress. No wheezes, rales, or rhonchi. No Stridor. Abdominal Soft. Bowel sounds normal. No distension. No tenderness.  Deferred. Extremities No edema of lower extremities. No tenderness. Boot on LLE. Neurological No focal deficits. Skin Skin is warm and dry. No rash noted. No erythema. Psychiatric Mood, affect, and judgment normal.        I personally reviewed the following Pertinent Labs/Studies:   - Prior CMP    Assessment:       ICD-10-CM ICD-9-CM    1.  Other hyperlipidemia  E78.49 272.4 LIPID PANEL      LIPID PANEL   2. Obesity (BMI 30-39. 9)  E66.9 278.00 HEMOGLOBIN A1C WITH EAG      HEMOGLOBIN A1C WITH EAG   3. Screening due  Z13.9 V82.9 HEPATITIS C AB      HEPATITIS C AB   4. Major depressive disorder, single episode, moderate (HCC)  F32.1 296.22    5. Hx of colonoscopy with polypectomy  Z98.890 V45.89     Z86.010 V12.72    6. Annual physical exam  Z00.00 V70.0    7. Alcohol use  Z72.89 V49.89    8. Anterior chest wall pain  R07.89 786.52 diclofenac (VOLTAREN) 1 % gel       Plan:     1. Other hyperlipidemia  - LIPID PANEL    2. Obesity (BMI 30-39.9)  - HEMOGLOBIN A1C WITH EAG    3. Screening due  - HEPATITIS C AB    4. Major depressive disorder, single episode, moderate ([facility] [address]  Patient following up with counselor and psychiatrist. Hussain Furry him he can let us know if he needs anything else. 5. Hx of colonoscopy with polypectomy  Patient to call GI doctor not sure when he is due for colonoscopy but do believe it is coming up. 6. Annual physical exam  Spent time on diet, exercise, and health maintenance topics. 7. Alcohol use  Gave handout on cutting back on alcohol use. 8. Anterior chest wall pain  Reproducible on exam. Patient given ER precautions. - diclofenac (VOLTAREN) 1 % gel; Apply  to affected area four (4) times daily. Dispense: 1 Each; Refill: 0      Will call for colonoscopy  Follow-up and Dispositions    · Return if symptoms worsen or fail to improve. Pt was discussed with Dr. Mark Cortez (attending physician). I have reviewed patient medical and social history and medications. I have reviewed pertinent labs results and other data. I have discussed the diagnosis with the patient and the intended plan as seen in the above orders. The patient has received an after-visit summary and questions were answered concerning future plans. I have discussed medication side effects and warnings with the patient as well.     Lefty Romo, DO  PGY-1 Resident  [facility]  03/16/22

## 2022-03-16 LAB
CHOLEST SERPL-MCNC: 266 MG/DL
EST. AVERAGE GLUCOSE BLD GHB EST-MCNC: 111 MG/DL
HBA1C MFR BLD: 5.5 % (ref 4–5.6)
HCV AB SERPL QL IA: NONREACTIVE
HDLC SERPL-MCNC: 43 MG/DL
HDLC SERPL: 6.2 {RATIO} (ref 0–5)
LDLC SERPL CALC-MCNC: 192.6 MG/DL (ref 0–100)
TRIGL SERPL-MCNC: 152 MG/DL (ref ?–150)
VLDLC SERPL CALC-MCNC: 30.4 MG/DL

## 2022-03-16 RX ORDER — ATORVASTATIN CALCIUM 40 MG/1
40 TABLET, FILM COATED ORAL DAILY
Qty: 30 TABLET | Refills: 3 | Status: SHIPPED | OUTPATIENT
Start: 2022-03-16 | End: 2022-06-14

## 2022-03-16 NOTE — PROGRESS NOTES
Spoke with patient via telephone. ASCVD risk > 5%. Patient agreeable to starting Lipitor. Will send in Rx. No diabetes, Hep C testing negative.

## 2022-03-19 PROBLEM — F41.9 ANXIETY: Status: ACTIVE | Noted: 2017-02-20

## 2022-03-19 PROBLEM — F32.1 MAJOR DEPRESSIVE DISORDER, SINGLE EPISODE, MODERATE (HCC): Status: ACTIVE | Noted: 2017-02-20

## 2022-03-19 PROBLEM — Z86.010 HX OF COLONOSCOPY WITH POLYPECTOMY: Status: ACTIVE | Noted: 2018-08-29

## 2022-03-19 PROBLEM — Z98.890 HX OF COLONOSCOPY WITH POLYPECTOMY: Status: ACTIVE | Noted: 2018-08-29

## 2022-04-11 DIAGNOSIS — R07.89 ANTERIOR CHEST WALL PAIN: ICD-10-CM

## 2022-04-13 RX ORDER — DICLOFENAC SODIUM 10 MG/G
GEL TOPICAL
Qty: 100 G | Refills: 0 | Status: SHIPPED | OUTPATIENT
Start: 2022-04-13

## 2022-06-14 RX ORDER — ATORVASTATIN CALCIUM 40 MG/1
TABLET, FILM COATED ORAL
Qty: 90 TABLET | Refills: 1 | Status: SHIPPED | OUTPATIENT
Start: 2022-06-14

## 2022-09-08 ENCOUNTER — NURSE TRIAGE (OUTPATIENT)
Dept: OTHER | Facility: CLINIC | Age: 52
End: 2022-09-08

## 2022-09-08 NOTE — TELEPHONE ENCOUNTER
Received call from Roshan Ortiz at [facility] with [employer]. Subjective: Caller states \"is still feeling short of breath with exertion, fatigue, wheezing    Energy level is very low    Has pain below left collar bone- states he has had this for over a year    Has seen a pulmonologist and cardiologist    Hasn't really been using his cpap every night    Hasn't been using inhalers every day    Current Symptoms:     Onset: 1 year ago; unchanged    Associated Symptoms: NA    Pain Severity: 4-5/10; aching; intermittent    Temperature:  denies fever    What has been tried: inhalers    LMP: NA Pregnant: NA    Recommended disposition: See in Office Within 2 Weeks    Care advice provided, patient verbalizes understanding; denies any other questions or concerns; instructed to call back for any new or worsening symptoms. Patient/Caller agrees with recommended disposition; writer provided warm transfer to Gary at [facility] for appointment scheduling    Attention Provider: Thank you for allowing me to participate in the care of your patient. The patient was connected to triage in response to information provided to the [facility]. Please do not respond through this encounter as the response is not directed to a shared pool.       Reason for Disposition   MILD longstanding difficulty breathing (e.g., speaks in phrases, SOB even at rest, pulse 100-120) and SAME as normal    Protocols used: Breathing Difficulty-ADULT-[place]

## 2022-09-10 ENCOUNTER — TELEPHONE (OUTPATIENT)
Dept: FAMILY MEDICINE CLINIC | Age: 52
End: 2022-09-10

## 2022-09-10 NOTE — TELEPHONE ENCOUNTER
----- Message from Yoly Donald sent at 9/8/2022  9:20 AM EDT -----  Subject: Referral Request    Reason for referral request? Patient requesting routine LAB order   especially for Cholesterol, the patient does have a return from [facility]   appointment scheduled on 9/16/2022. Provider patient wants to be referred to(if known):     Provider Phone Number(if known):     Additional Information for Provider?   ---------------------------------------------------------------------------  --------------  [address]    [phone]; OK to leave message on voicemail  ---------------------------------------------------------------------------  --------------

## 2022-09-13 DIAGNOSIS — G47.52 REM SLEEP BEHAVIOR DISORDER: ICD-10-CM

## 2022-09-13 RX ORDER — CLONAZEPAM 0.5 MG/1
1 TABLET ORAL
Qty: 60 TABLET | Refills: 1 | Status: SHIPPED | OUTPATIENT
Start: 2022-09-13

## 2022-09-28 ENCOUNTER — TELEPHONE (OUTPATIENT)
Dept: SLEEP MEDICINE | Age: 52
End: 2022-09-28

## 2022-12-15 ENCOUNTER — NURSE TRIAGE (OUTPATIENT)
Dept: OTHER | Facility: CLINIC | Age: 52
End: 2022-12-15

## 2022-12-15 NOTE — TELEPHONE ENCOUNTER
Location of patient: [address] johns from Dequan Flores at [facility] with [employer]. Subjective: Caller states \"I fell about 1 week ago and hurt my ankle and then a couple of days ago and I twisted it while walking on rocks. \"     Current Symptoms: right ankle injury and pain. Swollen, ankle bone about tennis ball size    Onset: 1 week ago; worsening    Associated Symptoms: NA    Pain Severity: 2-8/10; aching and throbbing; constant    Temperature: n/a     What has been tried: elevation, ice, ibuprofen. Epsom salt soak    LMP: NA Pregnant: NA    Recommended disposition: See PCP within 3 Days    Care advice provided, patient verbalizes understanding; denies any other questions or concerns; instructed to call back for any new or worsening symptoms. Patient/Caller agrees with recommended disposition; writer provided warm transfer to Joao at [facility] for appointment scheduling    Attention Provider: Thank you for allowing me to participate in the care of your patient. The patient was connected to triage in response to information provided to the [facility]. Please do not respond through this encounter as the response is not directed to a shared pool. Reason for Disposition   MODERATE pain (e.g., interferes with normal activities, limping) and present > 3 days    Protocols used:  Ankle Pain-ADULT-[place]

## 2022-12-16 ENCOUNTER — OFFICE VISIT (OUTPATIENT)
Dept: FAMILY MEDICINE CLINIC | Age: 52
End: 2022-12-16
Payer: COMMERCIAL

## 2022-12-16 VITALS
HEART RATE: 87 BPM | DIASTOLIC BLOOD PRESSURE: 81 MMHG | OXYGEN SATURATION: 98 % | BODY MASS INDEX: 32.89 KG/M2 | WEIGHT: 217 LBS | HEIGHT: 68 IN | RESPIRATION RATE: 16 BRPM | SYSTOLIC BLOOD PRESSURE: 120 MMHG

## 2022-12-16 DIAGNOSIS — S93.401A SPRAIN OF RIGHT ANKLE, UNSPECIFIED LIGAMENT, INITIAL ENCOUNTER: Primary | ICD-10-CM

## 2022-12-16 NOTE — PROGRESS NOTES
Rimma Talbert is a 46 y.o. male    No chief complaint on file. Injured ankle early last week, was starting to get better until he rolled it again this week. Swelled up again toes were blue. 7-8/10 pain when walking on it, when sitting itll     1. Have you been to the ER, urgent care clinic since your last visit? Hospitalized since your last visit? No  2. Have you seen or consulted any other health care providers outside of the [address] [place] [address] since your last visit? Include any pap smears or colon screening. No    There were no vitals taken for this visit.   3 most recent PHQ Screens 3/15/2022   Little interest or pleasure in doing things More than half the days   Feeling down, depressed, irritable, or hopeless More than half the days   Total Score PHQ 2 4   Trouble falling or staying asleep, or sleeping too much Not at all   Feeling tired or having little energy Nearly every day   Poor appetite, weight loss, or overeating Not at all   Feeling bad about yourself - or that you are a failure or have let yourself or your family down More than half the days   Trouble concentrating on things such as school, work, reading, or watching TV More than half the days   Moving or speaking so slowly that other people could have noticed; or the opposite being so fidgety that others notice Not at all   Thoughts of being better off dead, or hurting yourself in some way Not at all   PHQ 9 Score 11   How difficult have these problems made it for you to do your work, take care of your home and get along with others Somewhat difficult     Health Maintenance Due   Topic Date Due    COVID-19 Vaccine (3 - Booster for Chaidez Peter series) 08/15/2021    Flu Vaccine (1) 08/01/2022

## 2022-12-16 NOTE — PROGRESS NOTES
Subjective:   Naty Beckham is a 46 y.o. male who presents with right ankle/foot pain. Onset: ~ 2 weeks ago. Pt was walking on gravel and suddenly he twisted his R ankle, he was wearing work boots which he thinks made it worse. Developed immediate pain, swelling and bruising which has gotten better. Setting: Walking on gravel     Quality of pain: dull    Severity of pain: 7/10    Radiation: none    Alleviating factors: rest, ice and elevation, motrin    Exacerbating factors: walking    Treatments tried: Advil, elevation, as once a day, rest. Which has helped. Allergies - reviewed:   No Known Allergies      Medications - reviewed:   Current Outpatient Medications   Medication Sig    clonazePAM (KlonoPIN) 0.5 mg tablet TAKE 2 TABLETS BY MOUTH NIGHTLY. MAX DAILY AMOUNT: 1 MG.    atorvastatin (LIPITOR) 40 mg tablet TAKE 1 TABLET BY MOUTH EVERY DAY    pantoprazole (PROTONIX) 20 mg tablet Take 20 mg by mouth daily. albuterol (PROVENTIL HFA, VENTOLIN HFA, PROAIR HFA) 90 mcg/actuation inhaler 2 (TWO) PUFF FOUR TIMES DAILY, AS NEEDED FOR WHEEZING OR SHORTNESS OF BREATH    Breo Ellipta 200-25 mcg/dose inhaler TAKE 1 PUFF BY MOUTH EVERY DAY    diclofenac (VOLTAREN) 1 % gel APPLY TO AFFECTED AREA FOUR TIMES DAILY. chlorhexidine (PERIDEX) 0.12 % solution      No current facility-administered medications for this visit.          Past Medical History - reviewed:  Past Medical History:   Diagnosis Date    Anxiety 2/20/2017    Depression     DJD (degenerative joint disease) of cervical spine     HLD (hyperlipidemia) 11/18/2015    Hx of colonoscopy with polypectomy 8/29/2018    Colonoscopy completed 8/27/18 Polypectomy x2 (3-4mm descending colon and 5mm sigmoid colon) Tubular adenoma per path report Repeat colonoscopy in 5 years    Hyperlipidemia     Major depressive disorder, single episode, moderate ([facility] [address] 2/20/2017         Past Surgical History - reviewed:   Past Surgical History:   Procedure Laterality Date HX ORTHOPAEDIC  1994    ankle surgery    HX OTHER SURGICAL Left 01/28/2022    foot surgery    HX PROSTATE SURGERY  06/2019       ROS   Negative besides what is written in HPI. Objective:   Visit Vitals  /81 (BP 1 Location: Left upper arm, BP Patient Position: Sitting)   Pulse 87   Resp 16   Ht 5' 8\" (1.727 m)   Wt 217 lb (98.4 kg)   SpO2 98%   BMI 32.99 kg/m²       General: No acute distress, alert, cooperative. Lungs: CTAB. No w/r/r/c. Heart: RRR. +S1 and S2. No m/r/g. Ankle/foot:  Right:  Moderate swelling noted around the ankle mostly, + tenderness over the lateral malleolus, strength 4/5 on dorsiflexion and plantarflexion, sensation intact, pulses intact. Left:  Normal, strength 5/5 on dorsiflexion and plantarflexion, sensation intact, pulses intact. Skin: No obvious rash. Imaging:  Right Ankle 3 views. Reviewed by this MD, no obvious fracture noted. Mild tissues swelling. Assessment/Plan:       ICD-10-CM ICD-9-CM    1. Sprain of right ankle, unspecified ligament, initial encounter  S93.401A 845.00 XR ANKLE RT MIN 3 V      - Ice or heat 15 minutes, four times a day for 48 hours  - Aleve 220 mg every 12 hours as needed for pain (take with food to prevent stomach upset) or Tylenol 500 mg every 4 hours as needed for pain  - Elevation advised  - Rest as much as he can. - Compression advised as well. Elastic bandage wrap provided. - Reassurance given. - Home care as handout. I have discussed the diagnosis with the patient and the intended plan as seen in the above orders. Patient verbalized understanding of the plan and agrees with the plan. The patient has received an after-visit summary and questions were answered concerning future plans. I have discussed medication side effects and warnings with the patient as well. Informed patient to return to the office if new symptoms arise. Pt discussed with (Dr. Sahara Alvarado).      Ned Harris MD  Family Medicine Resident

## 2023-01-08 DIAGNOSIS — G47.52 REM SLEEP BEHAVIOR DISORDER: ICD-10-CM

## 2023-01-10 RX ORDER — CLONAZEPAM 0.5 MG/1
1 TABLET ORAL
Qty: 60 TABLET | Refills: 0 | Status: SHIPPED | OUTPATIENT
Start: 2023-01-10

## 2023-03-27 ENCOUNTER — NURSE TRIAGE (OUTPATIENT)
Dept: OTHER | Facility: CLINIC | Age: 53
End: 2023-03-27

## 2023-03-27 ENCOUNTER — OFFICE VISIT (OUTPATIENT)
Dept: FAMILY MEDICINE CLINIC | Age: 53
End: 2023-03-27

## 2023-03-27 VITALS
SYSTOLIC BLOOD PRESSURE: 128 MMHG | DIASTOLIC BLOOD PRESSURE: 83 MMHG | HEART RATE: 80 BPM | OXYGEN SATURATION: 92 % | WEIGHT: 213.13 LBS | BODY MASS INDEX: 32.41 KG/M2

## 2023-03-27 DIAGNOSIS — R07.89 ANTERIOR CHEST WALL PAIN: ICD-10-CM

## 2023-03-27 DIAGNOSIS — F90.9 ATTENTION DEFICIT HYPERACTIVITY DISORDER (ADHD), UNSPECIFIED ADHD TYPE: Primary | ICD-10-CM

## 2023-03-27 RX ORDER — HYDROXYZINE 25 MG/1
TABLET, FILM COATED ORAL
COMMUNITY
Start: 2023-03-25

## 2023-03-27 RX ORDER — TRAZODONE HYDROCHLORIDE 50 MG/1
TABLET ORAL
COMMUNITY
Start: 2023-03-25

## 2023-03-27 RX ORDER — CHOLECALCIFEROL TAB 125 MCG (5000 UNIT) 125 MCG
5000 TAB ORAL DAILY
COMMUNITY
Start: 2023-01-20

## 2023-03-27 RX ORDER — ZINC GLUCONATE 50 MG
1 TABLET ORAL DAILY
COMMUNITY
Start: 2023-01-20

## 2023-03-27 RX ORDER — ATOMOXETINE 25 MG/1
CAPSULE ORAL
COMMUNITY
Start: 2023-03-25

## 2023-03-27 NOTE — PROGRESS NOTES
Chief Complaint   Patient presents with    Shortness of Breath     Visit Vitals  /83 (BP 1 Location: Right arm, BP Patient Position: Sitting, BP Cuff Size: Adult)   Pulse 80   Wt 213 lb 2 oz (96.7 kg)   SpO2 92%   BMI 32.41 kg/m²

## 2023-03-27 NOTE — PROGRESS NOTES
Subjective  CC: Evette Deras is an 46 y.o. male with a PMHx of ADHD, HLD, who presents for EKG evaluation prior to start Stimulant ADHD med. Pt has been diagnosed with ADHD and currently taking Strattera 25 mg PO daily. Per pt he was requested to have a EKG done as Therapist would like to start a different type of medication. Follows with Perlita Jackman NP at [facility]. Fax number: [phone]. Pt mentioned he continues having L upper chest wall discomfort. Per chart review this has been present for many years and we have done EKG, ECHO and Stress test which have been ALL unremarkable but he has not been evaluated by a Cardiologist.   No family h/o of MI or heart disease. Pt is reproducible with palpation. Would like to be referred to the specialist. Otherwise no other issues. Allergies - reviewed:   No Known Allergies      Medications - reviewed:   Current Outpatient Medications   Medication Sig    Breo Ellipta 200-25 mcg/dose inhaler TAKE 1 PUFF BY MOUTH EVERY DAY    atomoxetine (STRATTERA) 25 mg capsule     cholecalciferol (VITAMIN D3) (5000 Units/125 mcg) tab tablet Take 5,000 Units by mouth daily. hydrOXYzine HCL (ATARAX) 25 mg tablet     Magnesium Oxide 500 mg cap Take 1 Capsule by mouth daily. traZODone (DESYREL) 50 mg tablet     clonazePAM (KlonoPIN) 0.5 mg tablet TAKE 2 TABLETS BY MOUTH NIGHTLY. MAX DAILY AMOUNT: 1 MG. (Patient not taking: Reported on 3/27/2023)    atorvastatin (LIPITOR) 40 mg tablet TAKE 1 TABLET BY MOUTH EVERY DAY (Patient not taking: Reported on 3/27/2023)    diclofenac (VOLTAREN) 1 % gel APPLY TO AFFECTED AREA FOUR TIMES DAILY. (Patient not taking: Reported on 3/27/2023)    chlorhexidine (PERIDEX) 0.12 % solution  (Patient not taking: Reported on 3/27/2023)    pantoprazole (PROTONIX) 20 mg tablet Take 20 mg by mouth daily.     albuterol (PROVENTIL HFA, VENTOLIN HFA, PROAIR HFA) 90 mcg/actuation inhaler 2 (TWO) PUFF FOUR TIMES DAILY, AS NEEDED FOR WHEEZING OR SHORTNESS OF BREATH (Patient not taking: Reported on 3/27/2023)     No current facility-administered medications for this visit. Past Medical History - reviewed:  Past Medical History:   Diagnosis Date    Anxiety 2/20/2017    Depression     DJD (degenerative joint disease) of cervical spine     HLD (hyperlipidemia) 11/18/2015    Hx of colonoscopy with polypectomy 8/29/2018    Colonoscopy completed 8/27/18 Polypectomy x2 (3-4mm descending colon and 5mm sigmoid colon) Tubular adenoma per path report Repeat colonoscopy in 5 years    Hyperlipidemia     Major depressive disorder, single episode, moderate ([address] 2/20/2017     ROS  Negative besides what is written in HPI. Physical Exam  Visit Vitals  /83 (BP 1 Location: Right arm, BP Patient Position: Sitting, BP Cuff Size: Adult)   Pulse 80   Wt 213 lb 2 oz (96.7 kg)   SpO2 92%   BMI 32.41 kg/m²     General: No acute distress. Alert. Cooperative. Respiratory: CTAB. No w/r/r/c.  Cardiovascular: RRR. Normal S1,S2. No m/r/g.   GI: Nondistended.+ bowel sounds. Nontender. No rebound tenderness or guarding. Extremities: No LE edema. Distal pulses present. Musculoskeletal: Full ROM in all extremities. Skin: Warm, dry. No rashes. Neuro: Alert and oriented. EKG: Read by this MD and Attending. NSR, HR: 68. Normal MN interval, normal QRS and Qtc. No Q waves, no ST or T wave abnormalities noted. Assessment/Plan  1. Attention deficit hyperactivity disorder (ADHD), unspecified ADHD type  - AMB POC EKG ROUTINE W/ 12 LEADS, INTER & REP - No abnormal findings. - Office visit note will be faxed to Psychiatrist NP.   - Final decision to start medication to be done by his NP.     2. Anterior chest wall pain: Likely MSK or Anxiety component related given unremarkable physical exam prior work up has been unrevealing.   - Reassurance provided. - REFERRAL TO CARDIOLOGY. Contact information given. Instructed to call and make an appt.        Follow-up and Dispositions    Return in about 4 weeks (around 4/24/2023), or if symptoms worsen or fail to improve, for Annual check . I have discussed the aforementioned diagnoses and plan with the patient in detail. I have provided information in person and/or in AVS. All questions answered prior to discharge.     Megan Mendez MD  Family Medicine Resident

## 2023-06-21 ENCOUNTER — HOSPITAL ENCOUNTER (OUTPATIENT)
Facility: HOSPITAL | Age: 53
Discharge: HOME | End: 2023-06-24
Attending: SPECIALIST

## 2023-06-21 DIAGNOSIS — R07.9 CHEST PAIN, UNSPECIFIED TYPE: ICD-10-CM

## 2023-06-21 DIAGNOSIS — E78.5 DYSLIPIDEMIA: ICD-10-CM

## 2023-06-21 PROCEDURE — 75571 CT HRT W/O DYE W/CA TEST: CPT

## 2023-06-26 ENCOUNTER — TELEPHONE (OUTPATIENT)
Age: 53
End: 2023-06-26

## 2023-06-29 RX ORDER — ROSUVASTATIN CALCIUM 20 MG/1
20 TABLET, COATED ORAL DAILY
Qty: 30 TABLET | Refills: 5 | Status: SHIPPED | OUTPATIENT
Start: 2023-06-29

## 2023-06-29 RX ORDER — ROSUVASTATIN CALCIUM 20 MG/1
20 TABLET, COATED ORAL DAILY
COMMUNITY
End: 2023-06-29 | Stop reason: SDUPTHER

## 2023-10-02 ENCOUNTER — TELEPHONE (OUTPATIENT)
Age: 53
End: 2023-10-02

## 2023-10-02 DIAGNOSIS — E78.5 DYSLIPIDEMIA: Primary | ICD-10-CM

## 2023-10-02 NOTE — TELEPHONE ENCOUNTER
Pt. Reports being tired recently, just started taking his Rosuvastatin medication again. Made an appt. For January 2024, wants to discuss his symptoms.     Pt. Phone - [phone]

## 2023-10-11 ENCOUNTER — NURSE TRIAGE (OUTPATIENT)
Dept: OTHER | Facility: CLINIC | Age: 53
End: 2023-10-11

## 2023-10-11 NOTE — TELEPHONE ENCOUNTER
[facility]    Received call from Annalisa Petersen at [facility] with [employer]. Subjective: Caller states \"throat is swollen\"     Current Symptoms: Hard to swallow food  Can barely drink  Side of throat is swollen  Lymph nodes swollen  Some runny nose  Throat hurts when swallowing  Throat is swollen    Onset: 2 days ago; gradual    Associated Symptoms: NA    Pain Severity: 7/10    Temperature: denies     What has been tried: ibuprofen    LMP: NA Pregnant: NA    Recommended disposition: See in Office Today    Care advice provided, patient verbalizes understanding; denies any other questions or concerns; instructed to call back for any new or worsening symptoms. Patient/Caller agrees with recommended disposition; writer provided warm transfer to Ryan Evans at [facility] for appointment scheduling    Attention Provider: Thank you for allowing me to participate in the care of your patient. The patient was connected to triage in response to information provided to the ECC/PSC. Please do not respond through this encounter as the response is not directed to a shared pool.     Reason for Disposition   Patient wants to be seen    Protocols used: Sore Throat-ADULT-[place]

## 2023-10-11 NOTE — TELEPHONE ENCOUNTER
This writer informed pt that there were no avail appts until tomorrow during evening clinic. He was advised to seek medical attention at an Urgent Care. He verbally communicated that he understood and would go.

## 2024-01-04 ENCOUNTER — OFFICE VISIT (OUTPATIENT)
Age: 54
End: 2024-01-04
Payer: COMMERCIAL

## 2024-01-04 VITALS
DIASTOLIC BLOOD PRESSURE: 80 MMHG | HEIGHT: 68 IN | BODY MASS INDEX: 32.13 KG/M2 | WEIGHT: 212 LBS | SYSTOLIC BLOOD PRESSURE: 120 MMHG

## 2024-01-04 DIAGNOSIS — R07.9 CHEST PAIN, UNSPECIFIED TYPE: Primary | ICD-10-CM

## 2024-01-04 DIAGNOSIS — G47.33 OSA (OBSTRUCTIVE SLEEP APNEA): ICD-10-CM

## 2024-01-04 DIAGNOSIS — E78.2 MIXED HYPERLIPIDEMIA: ICD-10-CM

## 2024-01-04 DIAGNOSIS — R93.1 AGATSTON CAC SCORE 100-199: ICD-10-CM

## 2024-01-04 PROCEDURE — 99214 OFFICE O/P EST MOD 30 MIN: CPT

## 2024-01-04 RX ORDER — ASPIRIN 81 MG/1
81 TABLET ORAL
COMMUNITY

## 2024-01-04 RX ORDER — TRAZODONE HYDROCHLORIDE 100 MG/1
TABLET ORAL
COMMUNITY

## 2024-01-04 NOTE — PATIENT INSTRUCTIONS
Keep using Icy hot and massage for your muscle pain    Use your CPAP every night    Please take your Asprin and Crestor every night   Yes

## 2024-01-04 NOTE — PROGRESS NOTES
Luke Mcrae is a 53 y.o. male    had concerns including Hyperlipidemia.    Vitals:    01/04/24 1451   BP: 120/80   Site: Left Upper Arm   Position: Sitting   Weight: 96.2 kg (212 lb)   Height: 1.727 m (5' 8\")        Chest pain NO    
deficits  Skin: Skin color is normal. No rashes or lesions. Non diaphoretic, moist.  Vascular: 2+ pulses symmetric in all extremities  Abdomen:   Soft, non-tender, bowel sounds are active.       EKG: Date: (3/27/2023) poor baseline but normal sinus rhythm  Plan     DIAGNOSTIC DATA     1. Echo  21-EF 60%, LAE  23- EF 60 - 65%,Trileaflet AV    2. Lipids  3/15/22- , HDL 43, .6,     3. Stress Test  23-Lexiscan-no ischemia, EF 65%    4. Calcium Score  23-  Left main: 0  Left anterior descendin  Left circumflex: 0  Right coronary: 0  Posterior descendin  OM1 0  1st Diag. 0  PLB 0   Total calcium score: 169        LABORATORY DATA       Lab Results   Component Value Date/Time    WBC 7.7 10/19/2020 11:10 AM    HGB 14.6 10/19/2020 11:10 AM    HCT 46.3 10/19/2020 11:10 AM     10/19/2020 11:10 AM    MCV 97.5 10/19/2020 11:10 AM      Lab Results   Component Value Date/Time     2021 10:35 AM    K 4.8 2021 10:35 AM     2021 10:35 AM    CO2 25 2021 10:35 AM    BUN 22 2021 10:35 AM    GFRAA >60 2021 10:35 AM    GLOB 3.4 2021 08:08 AM    ALT 75 2021 08:08 AM          ICD-10-CM    1. Chest pain, unspecified type  R07.9       2. Mixed hyperlipidemia  E78.2       3. LUCHO (obstructive sleep apnea)  G47.33       4. Agatston CAC score 100-199  R93.1       5. BMI 32.0-32.9,adult  Z68.32               ASSESSMENT/RECOMMENDATIONS:.      1. Chest discomfort  -23- Lexiscan Cardiolite normal perfusion  -23- Echo LVEF 60-65% no wma  -Icey hot and massage helps  -Reproducible with palpation, appears musculoskeletal  -ASA 81 mg a day, has not been taking daily. Counseled on med compliance    2. Dyslipidemia  Goal LDL from .   -Reduce red meat and dairy products  -23 calcium score 169 in LAD at that time Dr. Smalls increased crestor to 20 mg  -Pt has only taking 3x a week.Discussed med compliance  Repeat labs  Lab

## 2024-01-08 DIAGNOSIS — E78.5 DYSLIPIDEMIA: ICD-10-CM

## 2024-01-08 LAB
ALBUMIN SERPL-MCNC: 3.7 G/DL (ref 3.5–5)
ALBUMIN/GLOB SERPL: 1.1 (ref 1.1–2.2)
ALP SERPL-CCNC: 55 U/L (ref 45–117)
ALT SERPL-CCNC: 37 U/L (ref 12–78)
AST SERPL-CCNC: 16 U/L (ref 15–37)
BILIRUB DIRECT SERPL-MCNC: <0.1 MG/DL (ref 0–0.2)
BILIRUB SERPL-MCNC: 0.3 MG/DL (ref 0.2–1)
CHOLEST SERPL-MCNC: 207 MG/DL
GLOBULIN SER CALC-MCNC: 3.5 G/DL (ref 2–4)
HDLC SERPL-MCNC: 57 MG/DL
HDLC SERPL: 3.6 (ref 0–5)
LDLC SERPL CALC-MCNC: 131.2 MG/DL (ref 0–100)
PROT SERPL-MCNC: 7.2 G/DL (ref 6.4–8.2)
TRIGL SERPL-MCNC: 94 MG/DL
VLDLC SERPL CALC-MCNC: 18.8 MG/DL

## 2024-03-06 NOTE — PATIENT INSTRUCTIONS

## 2024-03-07 ENCOUNTER — OFFICE VISIT (OUTPATIENT)
Age: 54
End: 2024-03-07
Payer: COMMERCIAL

## 2024-03-07 VITALS
OXYGEN SATURATION: 94 % | SYSTOLIC BLOOD PRESSURE: 118 MMHG | WEIGHT: 209 LBS | HEART RATE: 86 BPM | BODY MASS INDEX: 31.67 KG/M2 | DIASTOLIC BLOOD PRESSURE: 76 MMHG | HEIGHT: 68 IN

## 2024-03-07 DIAGNOSIS — E78.2 MIXED HYPERLIPIDEMIA: Primary | ICD-10-CM

## 2024-03-07 DIAGNOSIS — E78.5 DYSLIPIDEMIA: ICD-10-CM

## 2024-03-07 DIAGNOSIS — R07.9 CHEST PAIN, UNSPECIFIED TYPE: Primary | ICD-10-CM

## 2024-03-07 DIAGNOSIS — G47.33 OSA (OBSTRUCTIVE SLEEP APNEA): ICD-10-CM

## 2024-03-07 DIAGNOSIS — R93.1 AGATSTON CAC SCORE 100-199: ICD-10-CM

## 2024-03-07 PROCEDURE — 93000 ELECTROCARDIOGRAM COMPLETE: CPT | Performed by: SPECIALIST

## 2024-03-07 PROCEDURE — 99214 OFFICE O/P EST MOD 30 MIN: CPT | Performed by: SPECIALIST

## 2024-03-07 NOTE — PROGRESS NOTES
had concerns including Chest Pain.    Vitals:    24 1603   BP: 118/76   Site: Left Upper Arm   Position: Sitting   Pulse: 86   SpO2: 94%   Weight: 94.8 kg (209 lb)   Height: 1.727 m (5' 8\")        Chest pain patient chest soreness having heartburn       Refills Crestor        1. Have you been to the ER, urgent care clinic since your last visit? No       Hospitalized since your last visit? No       Where?        Date?    NAME Luke CASTLE    1970      MRN    [id number]      LAST OFFICE APPOINTMENT: 2024     DIAGNOSIS    ICD-10-CM    1. Chest pain, unspecified type  R07.9 EKG 12 Lead      2. LUCHO (obstructive sleep apnea)  G47.33       3. BMI 32.0-32.9,adult  Z68.32       4. Dyslipidemia  E78.5       5. Agatston CAC score 100-199  R93.1           HOME MEDICATION  Current Outpatient Medications   Medication Sig    aspirin 81 MG EC tablet Take 1 tablet by mouth three times a week    traZODone (DESYREL) 100 MG tablet 45    rosuvastatin (CRESTOR) 20 MG tablet Take 1 tablet by mouth daily (Patient taking differently: Take 1 tablet by mouth three times a week)    atomoxetine (STRATTERA) 25 MG capsule Take 1 capsule by mouth daily (Patient not taking: Reported on 2024)    escitalopram (LEXAPRO) 10 MG tablet Take 1 tablet by mouth daily (Patient not taking: Reported on 2024)    clonazePAM (KLONOPIN) 0.5 MG tablet Take 1 mg by mouth. (Patient not taking: Reported on 2023)     No current facility-administered medications for this visit.       VITAL SIGNS  Wt Readings from Last 3 Encounters:   24 94.8 kg (209 lb)   24 96.2 kg (212 lb)   23 99.3 kg (219 lb)     BP Readings from Last 3 Encounters:   24 118/76   24 120/80   23 126/72     Pulse Readings from Last 3 Encounters:   24 86   23 70   23 73         SPECIALTY COMMENTS  1. Echo  21-EF 60%, LAE  23- EF 60 - 65%,Trileaflet AV    2. Lipids  3/15/22- , HDL 43, LDL 
Score  23-  Left main: 0  Left anterior descendin  Left circumflex: 0  Right coronary: 0  Posterior descendin  OM1 0  1st Diag. 0  PLB 0   Total calcium score: 169        LABORATORY DATA       Lab Results   Component Value Date/Time    WBC 7.7 10/19/2020 11:10 AM    HGB 14.6 10/19/2020 11:10 AM    HCT 46.3 10/19/2020 11:10 AM     10/19/2020 11:10 AM    MCV 97.5 10/19/2020 11:10 AM      Lab Results   Component Value Date/Time     2021 10:35 AM    K 4.8 2021 10:35 AM     2021 10:35 AM    CO2 25 2021 10:35 AM    BUN 22 2021 10:35 AM    GFRAA >60 2021 10:35 AM    GLOB 3.5 2024 10:10 AM    ALT 37 2024 10:10 AM          ICD-10-CM    1. Chest pain, unspecified type  R07.9 EKG 12 Lead      2. LUCHO (obstructive sleep apnea)  G47.33       3. BMI 32.0-32.9,adult  Z68.32       4. Dyslipidemia  E78.5       5. Agatston CAC score 100-199  R93.1               ASSESSMENT/RECOMMENDATIONS:.      1. Chest discomfort  -Most likely can stop his aspirin at this time  -Stress test and echo were normal  2. Dyslipidemia  -He most likely has familial hyperlipidemia with LDL at 191.  -I placed him on Crestor 10 mg a day and it appears that he is taking 20 mg a day currently  -LDL remains elevated so increased his Crestor to 40 mg a day  -Repeat cholesterol in 2 months  3. LUCHO  -We did not talk about  CPAP today  4. Obesity 33  -Encourage weight loss  5. Return in 6 months    Orders Placed This Encounter   Procedures    EKG 12 Lead     Order Specific Question:   Reason for Exam?     Answer:   Chest pain       We discussed the expected course, resolution and complications of the diagnosis(es) in detail.  Medication risks, benefits, costs, interactions, and alternatives were discussed as indicated.  I advised him to contact the office if his condition worsens, changes or fails to improve as anticipated. He expressed understanding with the diagnosis(es) and plan

## 2024-03-14 ENCOUNTER — TELEPHONE (OUTPATIENT)
Age: 54
End: 2024-03-14

## 2024-03-14 RX ORDER — ROSUVASTATIN CALCIUM 40 MG/1
40 TABLET, COATED ORAL EVERY EVENING
Qty: 30 TABLET | Refills: 5 | Status: SHIPPED | OUTPATIENT
Start: 2024-03-14

## 2024-03-14 RX ORDER — ROSUVASTATIN CALCIUM 40 MG/1
40 TABLET, COATED ORAL EVERY EVENING
COMMUNITY
End: 2024-03-14 | Stop reason: SDUPTHER

## 2024-03-14 NOTE — TELEPHONE ENCOUNTER
Patient called to get a refill on crestor 20mg. Patient also would like a call back with dosage instructions.     [facility] # [phone]

## 2024-06-26 ENCOUNTER — TELEPHONE (OUTPATIENT)
Age: 54
End: 2024-06-26

## 2024-06-26 NOTE — TELEPHONE ENCOUNTER
6/26/24 Tried alling pt Number in system not working letter sent of Appt time changed to 9:40am and from Dr. Cordoba to NP doroteol